# Patient Record
Sex: FEMALE | Race: BLACK OR AFRICAN AMERICAN | NOT HISPANIC OR LATINO | URBAN - METROPOLITAN AREA
[De-identification: names, ages, dates, MRNs, and addresses within clinical notes are randomized per-mention and may not be internally consistent; named-entity substitution may affect disease eponyms.]

---

## 2023-10-04 ENCOUNTER — PHARMACY VISIT (OUTPATIENT)
Dept: PHARMACY | Facility: CLINIC | Age: 73
End: 2023-10-04
Payer: MEDICAID

## 2023-10-04 PROCEDURE — RXMED WILLOW AMBULATORY MEDICATION CHARGE

## 2023-10-04 RX ORDER — GABAPENTIN 300 MG/1
CAPSULE ORAL
Qty: 60 CAPSULE | Refills: 0 | OUTPATIENT
Start: 2023-03-23 | End: 2023-12-06 | Stop reason: DRUGHIGH

## 2023-10-09 ENCOUNTER — TELEPHONE (OUTPATIENT)
Dept: PRIMARY CARE | Facility: CLINIC | Age: 73
End: 2023-10-09
Payer: COMMERCIAL

## 2023-10-09 NOTE — TELEPHONE ENCOUNTER
Pt left message stating that she is in severe pain and would like a call back to discuss adjustment of her current medications. Pt did not state where her pain is located or which medications she would like adjusted. Please advise.

## 2023-10-10 DIAGNOSIS — M81.0 AGE RELATED OSTEOPOROSIS, UNSPECIFIED PATHOLOGICAL FRACTURE PRESENCE: Primary | ICD-10-CM

## 2023-10-10 RX ORDER — ALENDRONATE SODIUM 70 MG/1
70 TABLET ORAL
COMMUNITY
End: 2023-10-10 | Stop reason: SDUPTHER

## 2023-10-11 ENCOUNTER — PHARMACY VISIT (OUTPATIENT)
Dept: PHARMACY | Facility: CLINIC | Age: 73
End: 2023-10-11
Payer: MEDICAID

## 2023-10-11 PROCEDURE — RXMED WILLOW AMBULATORY MEDICATION CHARGE

## 2023-10-11 RX ORDER — ALENDRONATE SODIUM 70 MG/1
70 TABLET ORAL
Qty: 12 TABLET | Refills: 1 | Status: SHIPPED | OUTPATIENT
Start: 2023-10-11 | End: 2024-04-06 | Stop reason: SDUPTHER

## 2023-10-15 PROBLEM — I10 HYPERTENSION: Status: ACTIVE | Noted: 2023-10-15

## 2023-10-15 PROBLEM — E21.0 PRIMARY HYPERPARATHYROIDISM (MULTI): Status: ACTIVE | Noted: 2023-10-15

## 2023-10-15 PROBLEM — D50.0 ANEMIA DUE TO BLOOD LOSS: Status: ACTIVE | Noted: 2023-10-15

## 2023-10-15 PROBLEM — R20.2 PARESTHESIA: Status: ACTIVE | Noted: 2023-10-15

## 2023-10-15 PROBLEM — E55.9 HYPOVITAMINOSIS D: Status: ACTIVE | Noted: 2023-10-15

## 2023-10-15 PROBLEM — R77.9 ELEVATED BLOOD PROTEIN: Status: ACTIVE | Noted: 2023-10-15

## 2023-10-15 PROBLEM — M81.0 OSTEOPOROSIS: Status: ACTIVE | Noted: 2023-10-15

## 2023-10-15 PROBLEM — E04.9 ENLARGED THYROID: Status: ACTIVE | Noted: 2023-10-15

## 2023-10-15 PROBLEM — M54.12 CERVICAL RADICULOPATHY: Status: ACTIVE | Noted: 2023-10-15

## 2023-10-15 RX ORDER — RISEDRONATE SODIUM 150 MG/1
TABLET, FILM COATED ORAL
COMMUNITY
Start: 2023-03-09

## 2023-10-15 RX ORDER — DICYCLOMINE HYDROCHLORIDE 10 MG/1
1 CAPSULE ORAL 3 TIMES DAILY PRN
COMMUNITY
Start: 2023-07-24 | End: 2024-06-04 | Stop reason: WASHOUT

## 2023-10-15 RX ORDER — ACETAMINOPHEN 325 MG/1
2 TABLET ORAL EVERY 6 HOURS PRN
COMMUNITY

## 2023-10-15 RX ORDER — BUSPIRONE HYDROCHLORIDE 10 MG/1
1 TABLET ORAL 2 TIMES DAILY
COMMUNITY
End: 2024-06-04 | Stop reason: WASHOUT

## 2023-10-15 RX ORDER — MAGNESIUM OXIDE/MAG AA CHELATE 300 MG
1 CAPSULE ORAL DAILY
COMMUNITY

## 2023-10-15 RX ORDER — ZOLPIDEM TARTRATE 5 MG/1
5 TABLET ORAL NIGHTLY PRN
COMMUNITY
End: 2024-06-04 | Stop reason: WASHOUT

## 2023-10-15 RX ORDER — CELECOXIB 100 MG/1
1 CAPSULE ORAL 2 TIMES DAILY
COMMUNITY
Start: 2023-01-25 | End: 2024-06-04 | Stop reason: WASHOUT

## 2023-10-15 RX ORDER — LIDOCAINE 50 MG/G
1 PATCH TOPICAL DAILY
COMMUNITY
End: 2024-06-04 | Stop reason: WASHOUT

## 2023-10-15 RX ORDER — TRAMADOL HYDROCHLORIDE 50 MG/1
1 TABLET ORAL EVERY 6 HOURS PRN
COMMUNITY
Start: 2023-04-05

## 2023-10-15 RX ORDER — ZOLPIDEM TARTRATE 10 MG/1
20 TABLET ORAL NIGHTLY PRN
COMMUNITY

## 2023-10-15 RX ORDER — ACETAMINOPHEN 500 MG
TABLET ORAL
COMMUNITY
Start: 2023-01-25

## 2023-10-15 RX ORDER — DIVALPROEX SODIUM 250 MG/1
1-2 TABLET, FILM COATED, EXTENDED RELEASE ORAL NIGHTLY PRN
COMMUNITY

## 2023-10-15 RX ORDER — IBUPROFEN 800 MG/1
800 TABLET ORAL EVERY 6 HOURS PRN
COMMUNITY

## 2023-10-15 RX ORDER — BUSPIRONE HYDROCHLORIDE 15 MG/1
1 TABLET ORAL 3 TIMES DAILY
COMMUNITY

## 2023-10-15 RX ORDER — CLONAZEPAM 0.5 MG/1
1 TABLET ORAL 2 TIMES DAILY PRN
COMMUNITY
Start: 2023-07-24
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
## 2023-10-17 ENCOUNTER — OFFICE VISIT (OUTPATIENT)
Dept: PRIMARY CARE | Facility: CLINIC | Age: 73
End: 2023-10-17
Payer: COMMERCIAL

## 2023-10-17 VITALS
WEIGHT: 123 LBS | DIASTOLIC BLOOD PRESSURE: 118 MMHG | TEMPERATURE: 97.6 F | OXYGEN SATURATION: 100 % | BODY MASS INDEX: 22.63 KG/M2 | HEART RATE: 92 BPM | HEIGHT: 62 IN | RESPIRATION RATE: 20 BRPM | SYSTOLIC BLOOD PRESSURE: 150 MMHG

## 2023-10-17 DIAGNOSIS — R63.6 PATIENT UNDERWEIGHT: ICD-10-CM

## 2023-10-17 DIAGNOSIS — F31.70 BIPOLAR AFFECTIVE DISORDER IN REMISSION (MULTI): ICD-10-CM

## 2023-10-17 DIAGNOSIS — M79.7 FIBROMYALGIA: ICD-10-CM

## 2023-10-17 DIAGNOSIS — I16.0 HYPERTENSIVE URGENCY: Primary | ICD-10-CM

## 2023-10-17 DIAGNOSIS — Z12.31 ENCOUNTER FOR SCREENING MAMMOGRAM FOR MALIGNANT NEOPLASM OF BREAST: ICD-10-CM

## 2023-10-17 DIAGNOSIS — R73.03 PREDIABETES: ICD-10-CM

## 2023-10-17 DIAGNOSIS — M81.0 AGE RELATED OSTEOPOROSIS, UNSPECIFIED PATHOLOGICAL FRACTURE PRESENCE: ICD-10-CM

## 2023-10-17 DIAGNOSIS — E55.9 VITAMIN D DEFICIENCY: ICD-10-CM

## 2023-10-17 DIAGNOSIS — I10 UNCONTROLLED HYPERTENSION: ICD-10-CM

## 2023-10-17 PROCEDURE — 1159F MED LIST DOCD IN RCRD: CPT | Performed by: FAMILY MEDICINE

## 2023-10-17 PROCEDURE — 99214 OFFICE O/P EST MOD 30 MIN: CPT | Performed by: FAMILY MEDICINE

## 2023-10-17 PROCEDURE — 3080F DIAST BP >= 90 MM HG: CPT | Performed by: FAMILY MEDICINE

## 2023-10-17 PROCEDURE — 4004F PT TOBACCO SCREEN RCVD TLK: CPT | Performed by: FAMILY MEDICINE

## 2023-10-17 PROCEDURE — 1125F AMNT PAIN NOTED PAIN PRSNT: CPT | Performed by: FAMILY MEDICINE

## 2023-10-17 PROCEDURE — 3077F SYST BP >= 140 MM HG: CPT | Performed by: FAMILY MEDICINE

## 2023-10-17 PROCEDURE — 93010 ELECTROCARDIOGRAM REPORT: CPT | Performed by: FAMILY MEDICINE

## 2023-10-17 PROCEDURE — 93005 ELECTROCARDIOGRAM TRACING: CPT | Performed by: FAMILY MEDICINE

## 2023-10-17 PROCEDURE — 99214 OFFICE O/P EST MOD 30 MIN: CPT | Mod: 25 | Performed by: FAMILY MEDICINE

## 2023-10-17 ASSESSMENT — COLUMBIA-SUICIDE SEVERITY RATING SCALE - C-SSRS
1. IN THE PAST MONTH, HAVE YOU WISHED YOU WERE DEAD OR WISHED YOU COULD GO TO SLEEP AND NOT WAKE UP?: NO
6. HAVE YOU EVER DONE ANYTHING, STARTED TO DO ANYTHING, OR PREPARED TO DO ANYTHING TO END YOUR LIFE?: NO
2. HAVE YOU ACTUALLY HAD ANY THOUGHTS OF KILLING YOURSELF?: NO

## 2023-10-17 ASSESSMENT — PAIN SCALES - GENERAL: PAINLEVEL: 10-WORST PAIN EVER

## 2023-10-17 ASSESSMENT — PATIENT HEALTH QUESTIONNAIRE - PHQ9
SUM OF ALL RESPONSES TO PHQ9 QUESTIONS 1 AND 2: 0
1. LITTLE INTEREST OR PLEASURE IN DOING THINGS: NOT AT ALL
2. FEELING DOWN, DEPRESSED OR HOPELESS: NOT AT ALL

## 2023-10-17 ASSESSMENT — ENCOUNTER SYMPTOMS
DEPRESSION: 0
OCCASIONAL FEELINGS OF UNSTEADINESS: 0
LOSS OF SENSATION IN FEET: 0

## 2023-10-17 NOTE — PATIENT INSTRUCTIONS
-please go immediately to Dr. Vallabehini's office (cardiology); you have an urgent 10:30 am appt for extremely elevated blood pressure. Please have your daughter drive you.      -please call your psychiatrist since you have not slept in over 2 days, and feeling manic.    -if on the way to Dr. Vallabehini's office you develop chest pain, visual changes, weakness, slurred speech, or facial droop please immediately call 911.    -please go to [facility] to have your labs drawn    -please schedule a follow-up visit with me in 2 weeks for blood pressure check  and 4 weeks for physical

## 2023-10-20 ENCOUNTER — TELEPHONE (OUTPATIENT)
Dept: PRIMARY CARE | Facility: CLINIC | Age: 73
End: 2023-10-20
Payer: COMMERCIAL

## 2023-10-20 NOTE — TELEPHONE ENCOUNTER
Call placed to patient. Made aware that orders are no longer needed and can just go to the lab of her choice. Should be in system.

## 2023-10-21 ENCOUNTER — LAB (OUTPATIENT)
Dept: LAB | Facility: LAB | Age: 73
End: 2023-10-21
Payer: COMMERCIAL

## 2023-10-21 DIAGNOSIS — I16.0 HYPERTENSIVE URGENCY: ICD-10-CM

## 2023-10-21 DIAGNOSIS — R73.03 PREDIABETES: ICD-10-CM

## 2023-10-21 DIAGNOSIS — G89.29 OTHER CHRONIC PAIN: Primary | ICD-10-CM

## 2023-10-21 DIAGNOSIS — R63.6 PATIENT UNDERWEIGHT: ICD-10-CM

## 2023-10-21 DIAGNOSIS — F31.70 BIPOLAR AFFECTIVE DISORDER IN REMISSION (MULTI): ICD-10-CM

## 2023-10-21 DIAGNOSIS — M79.7 FIBROMYALGIA: ICD-10-CM

## 2023-10-21 DIAGNOSIS — E55.9 VITAMIN D DEFICIENCY: ICD-10-CM

## 2023-10-21 LAB
25(OH)D3 SERPL-MCNC: 34 NG/ML (ref 31–100)
ALBUMIN SERPL-MCNC: 4.5 G/DL (ref 3.5–5)
ALP BLD-CCNC: 68 U/L (ref 35–125)
ALT SERPL-CCNC: 11 U/L (ref 5–40)
ANION GAP SERPL CALC-SCNC: 14 MMOL/L
AST SERPL-CCNC: 21 U/L (ref 5–40)
BASOPHILS # BLD AUTO: 0.03 X10*3/UL (ref 0–0.1)
BASOPHILS NFR BLD AUTO: 0.5 %
BILIRUB SERPL-MCNC: 0.2 MG/DL (ref 0.1–1.2)
BUN SERPL-MCNC: 17 MG/DL (ref 8–25)
CALCIUM SERPL-MCNC: 9.4 MG/DL (ref 8.5–10.4)
CHLORIDE SERPL-SCNC: 101 MMOL/L (ref 97–107)
CHOLEST SERPL-MCNC: 181 MG/DL (ref 133–200)
CHOLEST/HDLC SERPL: 2.7 {RATIO}
CO2 SERPL-SCNC: 24 MMOL/L (ref 24–31)
CREAT SERPL-MCNC: 0.9 MG/DL (ref 0.4–1.6)
EOSINOPHIL # BLD AUTO: 0.12 X10*3/UL (ref 0–0.4)
EOSINOPHIL NFR BLD AUTO: 2 %
ERYTHROCYTE [DISTWIDTH] IN BLOOD BY AUTOMATED COUNT: 15.9 % (ref 11.5–14.5)
EST. AVERAGE GLUCOSE BLD GHB EST-MCNC: 123 MG/DL
GFR SERPL CREATININE-BSD FRML MDRD: 68 ML/MIN/1.73M*2
GLUCOSE SERPL-MCNC: 95 MG/DL (ref 65–99)
HBA1C MFR BLD: 5.9 %
HCT VFR BLD AUTO: 44.9 % (ref 36–52)
HDLC SERPL-MCNC: 67 MG/DL
HGB BLD-MCNC: 14.8 G/DL (ref 12–17.5)
IMM GRANULOCYTES # BLD AUTO: 0.02 X10*3/UL (ref 0–0.5)
IMM GRANULOCYTES NFR BLD AUTO: 0.3 % (ref 0–0.9)
LDLC SERPL CALC-MCNC: 101 MG/DL (ref 65–130)
LYMPHOCYTES # BLD AUTO: 2.03 X10*3/UL (ref 0.8–3)
LYMPHOCYTES NFR BLD AUTO: 33.7 %
MCH RBC QN AUTO: 27.4 PG (ref 26–34)
MCHC RBC AUTO-ENTMCNC: 33 G/DL (ref 32–36)
MCV RBC AUTO: 83 FL (ref 80–100)
MONOCYTES # BLD AUTO: 0.87 X10*3/UL (ref 0.05–0.8)
MONOCYTES NFR BLD AUTO: 14.4 %
NEUTROPHILS # BLD AUTO: 2.96 X10*3/UL (ref 1.6–5.5)
NEUTROPHILS NFR BLD AUTO: 49.1 %
NRBC BLD-RTO: 0 /100 WBCS (ref 0–0)
PLATELET # BLD AUTO: 291 X10*3/UL (ref 150–450)
PMV BLD AUTO: 10.1 FL (ref 7.5–11.5)
POTASSIUM SERPL-SCNC: 4.6 MMOL/L (ref 3.4–5.1)
PREALB SERPL-MCNC: 18.1 MG/DL (ref 18–40)
PROT SERPL-MCNC: 7.4 G/DL (ref 5.9–7.9)
RBC # BLD AUTO: 5.41 X10*6/UL (ref 4–5.9)
SODIUM SERPL-SCNC: 139 MMOL/L (ref 133–145)
TRIGL SERPL-MCNC: 63 MG/DL (ref 40–150)
TSH SERPL DL<=0.05 MIU/L-ACNC: 1.44 MIU/L (ref 0.27–4.2)
VIT B12 SERPL-MCNC: 1197 PG/ML (ref 211–946)
WBC # BLD AUTO: 6 X10*3/UL (ref 4.4–11.3)

## 2023-10-21 PROCEDURE — 82607 VITAMIN B-12: CPT

## 2023-10-21 PROCEDURE — 83036 HEMOGLOBIN GLYCOSYLATED A1C: CPT

## 2023-10-21 PROCEDURE — 80061 LIPID PANEL: CPT

## 2023-10-21 PROCEDURE — 84425 ASSAY OF VITAMIN B-1: CPT

## 2023-10-21 PROCEDURE — 85025 COMPLETE CBC W/AUTO DIFF WBC: CPT

## 2023-10-21 PROCEDURE — 84443 ASSAY THYROID STIM HORMONE: CPT

## 2023-10-21 PROCEDURE — 80053 COMPREHEN METABOLIC PANEL: CPT

## 2023-10-21 PROCEDURE — 82306 VITAMIN D 25 HYDROXY: CPT

## 2023-10-21 PROCEDURE — 84134 ASSAY OF PREALBUMIN: CPT

## 2023-10-21 PROCEDURE — 36415 COLL VENOUS BLD VENIPUNCTURE: CPT

## 2023-10-23 ENCOUNTER — TELEPHONE (OUTPATIENT)
Dept: PRIMARY CARE | Facility: CLINIC | Age: 73
End: 2023-10-23
Payer: COMMERCIAL

## 2023-10-23 NOTE — TELEPHONE ENCOUNTER
I am only comfortable prescribing her gabapentin. She should discuss with her rheumatologist pain medications and pain management.  I can refer her to pain management if she desires to discuss pain medications with pain management.      She can schedule to see for a follow-up visit to discuss her pain medications.

## 2023-10-23 NOTE — TELEPHONE ENCOUNTER
Pt stopped in stating at her appt the meds to help her manage her pain were never sent. Please advise any rx to [facility]

## 2023-10-23 NOTE — TELEPHONE ENCOUNTER
She never asked for meds to be refilled. Her BP was very high and that was the fous of the visit, with urgent referral to  cardiology.  If she meds refill, please send to me as refill request. Thank you

## 2023-10-23 NOTE — TELEPHONE ENCOUNTER
The pt daughter said they came into the office with the intent of addressing her whole body soreness and swelling to the right side, but the focus of the appointment went to her BP being so high that the pain was never sddresed. Pt is on gabapentin but wanted something different to help her with her pain. This is the missed pain meds she was talking about. Do you wish to have her come back in for another appointment dedicated to her pain? Please review and advise, thank you   No

## 2023-10-24 ENCOUNTER — PHARMACY VISIT (OUTPATIENT)
Dept: PHARMACY | Facility: CLINIC | Age: 73
End: 2023-10-24
Payer: MEDICAID

## 2023-10-24 PROCEDURE — RXOTC WILLOW AMBULATORY OTC CHARGE

## 2023-10-24 PROCEDURE — RXMED WILLOW AMBULATORY MEDICATION CHARGE

## 2023-10-24 RX ORDER — LOSARTAN POTASSIUM 50 MG/1
TABLET ORAL
Qty: 45 TABLET | Refills: 1 | OUTPATIENT
Start: 2023-10-24 | End: 2024-03-25 | Stop reason: WASHOUT

## 2023-10-25 LAB — VIT B1 PYROPHOSHATE BLD-SCNC: 103 NMOL/L (ref 70–180)

## 2023-10-26 ENCOUNTER — TELEPHONE (OUTPATIENT)
Dept: PRIMARY CARE | Facility: CLINIC | Age: 73
End: 2023-10-26
Payer: COMMERCIAL

## 2023-10-26 NOTE — TELEPHONE ENCOUNTER
Spoke with pt.she states she saw the signs out front and wanted to know where we were moving to. Made aware we will remain in this building only UC is moving

## 2023-10-27 ENCOUNTER — TELEPHONE (OUTPATIENT)
Dept: PRIMARY CARE | Facility: CLINIC | Age: 73
End: 2023-10-27
Payer: COMMERCIAL

## 2023-10-27 NOTE — TELEPHONE ENCOUNTER
Spoke with pt yesterday- she does not need to schedule anything else. Called again and made pt aware via vm

## 2023-10-27 NOTE — TELEPHONE ENCOUNTER
Patient called in stating she has been getting calls that she needs to schedule an appointment with cardiology. Pt states she seen cardiology on Tuesday and wants to know if she needs to schedule another appointment or if the calls she has received are a mistake. [phone]

## 2023-11-06 ENCOUNTER — APPOINTMENT (OUTPATIENT)
Dept: PHYSICAL THERAPY | Facility: CLINIC | Age: 73
End: 2023-11-06

## 2023-11-07 ENCOUNTER — APPOINTMENT (OUTPATIENT)
Dept: PRIMARY CARE | Facility: CLINIC | Age: 73
End: 2023-11-07
Payer: COMMERCIAL

## 2023-11-08 ENCOUNTER — APPOINTMENT (OUTPATIENT)
Dept: PHYSICAL THERAPY | Facility: CLINIC | Age: 73
End: 2023-11-08

## 2023-11-20 ENCOUNTER — TELEPHONE (OUTPATIENT)
Dept: PRIMARY CARE | Facility: CLINIC | Age: 73
End: 2023-11-20
Payer: COMMERCIAL

## 2023-11-20 DIAGNOSIS — M81.0 OSTEOPOROSIS, UNSPECIFIED OSTEOPOROSIS TYPE, UNSPECIFIED PATHOLOGICAL FRACTURE PRESENCE: Primary | ICD-10-CM

## 2023-11-20 NOTE — TELEPHONE ENCOUNTER
PT came in requesting RX be sent in for handicap placard.  States she came in a few weeks ago and dropped off the paperwork, and it was mailed back without the doctors prescription.  Please call PT back when completed.  [phone]

## 2023-11-21 ENCOUNTER — APPOINTMENT (OUTPATIENT)
Dept: PEDIATRIC GASTROENTEROLOGY | Facility: CLINIC | Age: 73
End: 2023-11-21
Payer: COMMERCIAL

## 2023-11-22 ENCOUNTER — EVALUATION (OUTPATIENT)
Dept: PHYSICAL THERAPY | Facility: CLINIC | Age: 73
End: 2023-11-22
Payer: COMMERCIAL

## 2023-11-22 DIAGNOSIS — M79.601 RIGHT ARM PAIN: Primary | ICD-10-CM

## 2023-11-22 DIAGNOSIS — G89.29 OTHER CHRONIC PAIN: ICD-10-CM

## 2023-11-22 DIAGNOSIS — R52 CHRONIC PAIN OF MULTIPLE SITES: ICD-10-CM

## 2023-11-22 DIAGNOSIS — M79.641 RIGHT HAND PAIN: ICD-10-CM

## 2023-11-22 DIAGNOSIS — G89.29 CHRONIC PAIN OF MULTIPLE SITES: ICD-10-CM

## 2023-11-22 PROCEDURE — 97162 PT EVAL MOD COMPLEX 30 MIN: CPT | Mod: GP

## 2023-11-22 NOTE — PROGRESS NOTES
Physical Therapy    Physical Therapy Evaluation and Treatment      Patient Name: Christiana Ferguson  MRN: [id number]  Today's Date: 11/22/2023  Time Calculation  Start Time: 0950  Stop Time: 1025  Time Calculation (min): 35 min    Assessment:   73 yoF presenting to therapy for recurring multi site pain that was successfully treated with pain education classes at [facility] previously. Her symptoms are slowly returning and she hopes to received modalities and learn exercises to manage her symptoms. Physical therapy should include dry needling, soft tissue mobilizations, manual stretching, moist heat and light exercise.    Plan:  OP PT Plan  Treatment/Interventions: Dry needling, Education/ Instruction, Hot pack, Gait training, Electrical stimulation, Neuromuscular re-education, Manual therapy, Therapeutic exercises, Therapeutic activities  PT Plan: Skilled PT  PT Frequency: 2 times per week  Duration: 8  Rehab Potential: Good  Plan of Care Agreement: Patient    Current Problem:   1. Right arm pain        2. Other chronic pain  Referral to Physical Therapy      3. Chronic pain of multiple sites        4. Right hand pain            Subjective    Explains she has suffered from pain for several years.  Presents today for multi site pain: R neck and UE, R knee, Left foot  1) H/o Right hand surgery (carpal tunnel, trigger finger) 04/2023, [facility]. This was helpful, but her Left hand also needs surgery. Feels she is compensating by overusing her Right arm so it is making her Right arm stiff and sore. Right hand dominant  2) Right knee pain that limits her ability to walk and stand without pain  3) Left foot pain secondary to h/o fracturing multiple toes while sleep walking    Explains she did attend pain education classes at [facility] a few years ago. It was an 8 week program that included yoga, acupuncture, acupressure, and massage therapy. This was very helpful and resolved most of her pain.     Traveled to [place] in  2022 which started to slowly re-aggravate her pain. Her prior symptoms now feel exacerbated. She feels stiff, but is worried she will feel stiffer if she doesn't start exercising.     Lives alone. Adult children out of town. Daughter to return from being out of town in mid December.  Enjoys participating in [employer], making an  walking stick  Struggles with sleeping well at night. Takes multiple sleep medications, has completed numerous sleep studies, and sleep walks often.      Precautions:  HTN, neuropathy, OA, osteoporosis, RA    Pain:  5/10      Objective   Gait: Ambulates independently without device, slow but steady    Transfers: Independent with all transfers with use of UE PRN    Functional mobility: Pt demonstrates multi site AROM WFL with reports of mild pain and discomfort    Outcome Measures:  NDI  34%    Treatments:  No treatment initiated today    EDUCATION:  Education on benefits of physical therapy and plan of care  HEP not issued this date    Goals:  1) Pt will report 0/10 pain at rest and with ADL's  2) Pt will be compliant with HEP  3) Pt will be independent and painfree lifting a coffee cup  4) NDI < 20%

## 2023-12-04 ENCOUNTER — PHARMACY VISIT (OUTPATIENT)
Dept: PHARMACY | Facility: CLINIC | Age: 73
End: 2023-12-04
Payer: MEDICAID

## 2023-12-04 ENCOUNTER — OFFICE VISIT (OUTPATIENT)
Dept: OPHTHALMOLOGY | Facility: CLINIC | Age: 73
End: 2023-12-04
Payer: COMMERCIAL

## 2023-12-04 DIAGNOSIS — H52.7 UNSPECIFIED DISORDER OF REFRACTION: ICD-10-CM

## 2023-12-04 DIAGNOSIS — H25.813 COMBINED FORMS OF AGE-RELATED CATARACT OF BOTH EYES: Primary | ICD-10-CM

## 2023-12-04 PROBLEM — H25.13 AGE-RELATED NUCLEAR CATARACT OF BOTH EYES: Status: ACTIVE | Noted: 2023-12-04

## 2023-12-04 PROCEDURE — 92015 DETERMINE REFRACTIVE STATE: CPT | Performed by: OPHTHALMOLOGY

## 2023-12-04 PROCEDURE — 99213 OFFICE O/P EST LOW 20 MIN: CPT | Performed by: OPHTHALMOLOGY

## 2023-12-04 PROCEDURE — RXMED WILLOW AMBULATORY MEDICATION CHARGE

## 2023-12-04 RX ORDER — LOSARTAN POTASSIUM 100 MG/1
TABLET ORAL
Qty: 90 TABLET | Refills: 1 | OUTPATIENT
Start: 2023-12-04

## 2023-12-04 ASSESSMENT — ENCOUNTER SYMPTOMS
ALLERGIC/IMMUNOLOGIC NEGATIVE: 0
DEPRESSION: 0
OCCASIONAL FEELINGS OF UNSTEADINESS: 0
HEMATOLOGIC/LYMPHATIC NEGATIVE: 0
NEUROLOGICAL NEGATIVE: 1
EYES NEGATIVE: 0
ENDOCRINE NEGATIVE: 0
GASTROINTESTINAL NEGATIVE: 0
RESPIRATORY NEGATIVE: 0
PSYCHIATRIC NEGATIVE: 0
MUSCULOSKELETAL NEGATIVE: 0
CARDIOVASCULAR NEGATIVE: 0
CONSTITUTIONAL NEGATIVE: 0
LOSS OF SENSATION IN FEET: 0

## 2023-12-04 ASSESSMENT — KERATOMETRY
OD_K2POWER_DIOPTERS: 44.00
OD_AXISANGLE2_DEGREES: 170
OS_AXISANGLE_DEGREES: 85
OS_AXISANGLE2_DEGREES: 175
OS_K1POWER_DIOPTERS: 42.25
OD_AXISANGLE_DEGREES: 80
OS_K2POWER_DIOPTERS: 42.75
METHOD_AUTO_MANUAL: AUTOMATED
OD_K1POWER_DIOPTERS: 42.50

## 2023-12-04 ASSESSMENT — PATIENT HEALTH QUESTIONNAIRE - PHQ9
1. LITTLE INTEREST OR PLEASURE IN DOING THINGS: NOT AT ALL
2. FEELING DOWN, DEPRESSED OR HOPELESS: NOT AT ALL
SUM OF ALL RESPONSES TO PHQ9 QUESTIONS 1 AND 2: 0

## 2023-12-04 ASSESSMENT — SLIT LAMP EXAM - LIDS
COMMENTS: NORMAL
COMMENTS: NORMAL

## 2023-12-04 ASSESSMENT — REFRACTION_WEARINGRX
OD_SPHERE: +2.50
OS_SPHERE: +2.50
SPECS_TYPE: READERS

## 2023-12-04 ASSESSMENT — REFRACTION_MANIFEST
OD_SPHERE: +0.50
OD_AXIS: 015
OD_CYLINDER: -0.25

## 2023-12-04 ASSESSMENT — EXTERNAL EXAM - LEFT EYE: OS_EXAM: NORMAL

## 2023-12-04 ASSESSMENT — CUP TO DISC RATIO
OD_RATIO: 0.35
OS_RATIO: 0.35

## 2023-12-04 ASSESSMENT — EXTERNAL EXAM - RIGHT EYE: OD_EXAM: NORMAL

## 2023-12-04 ASSESSMENT — VISUAL ACUITY
OD_SC: 20/25
OS_SC: 20/25
METHOD: SNELLEN - SINGLE

## 2023-12-04 ASSESSMENT — TONOMETRY
OD_IOP_MMHG: 11
IOP_METHOD: GOLDMANN APPLANATION
OS_IOP_MMHG: 11

## 2023-12-04 ASSESSMENT — PAIN SCALES - GENERAL: PAINLEVEL: 0-NO PAIN

## 2023-12-04 NOTE — PATIENT INSTRUCTIONS
Thank you so much for choosing me to provide your care today!    If you were dilated your vision may remain blurry   or light sensitive for several hours.    The nature of eye and vision problems can require frequent follow up, please make every effort to adhere to any future appointments.    If you have any issues, questions, or concerns,   please do not hesitate to reach out.    If you receive a survey in regards to your care today, please mention any exceptional care my office staff and/or technicians provided.    You can reach our office at this number:  [phone]

## 2023-12-04 NOTE — PROGRESS NOTES
Assessment/Plan   Problem List Items Addressed This Visit          Eye/Vision problems    Combined forms of age-related cataract of both eyes - Primary     Non significant cataract noted on exam. Will plan to continue to monitor with serial exam. Suspect may be borderline with glare symptoms, will plan glare testing next year as not interested at this time for surgery.            Unspecified disorder of refraction     Discussed glasses prescription from refraction. Will provide if patient interested in keeping for records or to fill as a new set of glasses.               Provided reassurance regarding above diagnoses and care received in the office visit today. Discussed outcomes and options along with the importance of treatment compliance. Understands the importance of any follow up visits. Patient instructed to call/communicate with our office if any new issues, questions, or concerns.     Will plan to see back in 1 year for full or sooner PRN

## 2023-12-04 NOTE — ASSESSMENT & PLAN NOTE
Non significant cataract noted on exam. Will plan to continue to monitor with serial exam. Suspect may be borderline with glare symptoms, will plan glare testing next year as not interested at this time for surgery.

## 2023-12-06 ENCOUNTER — OFFICE VISIT (OUTPATIENT)
Dept: RHEUMATOLOGY | Facility: CLINIC | Age: 73
End: 2023-12-06
Payer: COMMERCIAL

## 2023-12-06 VITALS
WEIGHT: 123 LBS | BODY MASS INDEX: 23.22 KG/M2 | SYSTOLIC BLOOD PRESSURE: 120 MMHG | OXYGEN SATURATION: 97 % | HEIGHT: 61 IN | DIASTOLIC BLOOD PRESSURE: 78 MMHG | HEART RATE: 88 BPM

## 2023-12-06 DIAGNOSIS — M81.0 AGE-RELATED OSTEOPOROSIS WITHOUT CURRENT PATHOLOGICAL FRACTURE: Primary | ICD-10-CM

## 2023-12-06 DIAGNOSIS — M79.7 FIBROMYALGIA SYNDROME: ICD-10-CM

## 2023-12-06 PROCEDURE — 1125F AMNT PAIN NOTED PAIN PRSNT: CPT | Performed by: INTERNAL MEDICINE

## 2023-12-06 PROCEDURE — 1160F RVW MEDS BY RX/DR IN RCRD: CPT | Performed by: INTERNAL MEDICINE

## 2023-12-06 PROCEDURE — RXMED WILLOW AMBULATORY MEDICATION CHARGE

## 2023-12-06 PROCEDURE — 3074F SYST BP LT 130 MM HG: CPT | Performed by: INTERNAL MEDICINE

## 2023-12-06 PROCEDURE — 99214 OFFICE O/P EST MOD 30 MIN: CPT | Performed by: INTERNAL MEDICINE

## 2023-12-06 PROCEDURE — 1159F MED LIST DOCD IN RCRD: CPT | Performed by: INTERNAL MEDICINE

## 2023-12-06 PROCEDURE — 4004F PT TOBACCO SCREEN RCVD TLK: CPT | Performed by: INTERNAL MEDICINE

## 2023-12-06 PROCEDURE — 3078F DIAST BP <80 MM HG: CPT | Performed by: INTERNAL MEDICINE

## 2023-12-06 RX ORDER — GABAPENTIN 100 MG/1
100 CAPSULE ORAL 3 TIMES DAILY
Qty: 90 CAPSULE | Refills: 0 | Status: SHIPPED | OUTPATIENT
Start: 2023-12-06 | End: 2024-06-04 | Stop reason: DRUGHIGH

## 2023-12-06 ASSESSMENT — ROUTINE ASSESSMENT OF PATIENT INDEX DATA (RAPID3)
DRESS_YOURSELF: WITH SOME DIFFICULTY
IN_OUT_BED: WITH SOME DIFFICULTY
ON A SCALE OF ONE TO TEN, CONSIDERING ALL THE WAYS IN WHICH ILLNESS AND HEALTH CONDITIONS MAY AFFECT YOU AT THIS TIME, PLEASE INDICATE BELOW HOW YOU ARE DOING:: 6.5
WASH_DRY_BODY: WITHOUT ANY DIFFICULTY
PICK_CLOTHES_OFF_FLOOR: WITH SOME DIFFICULTY
TOTAL RAPID3 SCORE: 19.8
SUM OF QUESTIONS A TO J: 10
FEELINGS_ANXIETY_NERVOUS: UNABLE TO DO
PARTIPATE_RECREATIONAL_ACTIVITIES: WITH SOME DIFFICULTY
SEVERITY_SCORE: 0
WALK_KILOMETERS: UNABLE TO DO
WEIGHTED_TOTAL_SCORE: 6.6
LIFT_CUP_TO_MOUTH: WITHOUT ANY DIFFICULTY
FN_SCORE: 3.3
FEELINGS_DEPRESSION: WITHOUT ANY DIFFICULTY
ON A SCALE OF ONE TO TEN, HOW MUCH PAIN HAVE YOU HAD BECAUSE OF YOUR CONDITION OVER THE PAST WEEK?: 10
IN_OUT_TRANSPORT: WITH SOME DIFFICULTY
GOOD_NIGHTS_SLEEP: UNABLE TO DO
ON A SCALE OF ONE TO TEN, CONSIDERING ALL THE WAYS IN WHICH ILLNESS AND HEALTH CONDITIONS MAY AFFECT YOU AT THIS TIME, PLEASE INDICATE BELOW HOW YOU ARE DOING:: 6.5
ON A SCALE OF ONE TO TEN, HOW MUCH PAIN HAVE YOU HAD BECAUSE OF YOUR CONDITION OVER THE PAST WEEK?: 10
WALK_FLAT_GROUND: WITH SOME DIFFICULTY
TURN_FAUCETS_OFF: WITH SOME DIFFICULTY

## 2023-12-06 ASSESSMENT — ENCOUNTER SYMPTOMS
OCCASIONAL FEELINGS OF UNSTEADINESS: 0
LOSS OF SENSATION IN FEET: 0
DEPRESSION: 0

## 2023-12-06 ASSESSMENT — PATIENT HEALTH QUESTIONNAIRE - PHQ9
1. LITTLE INTEREST OR PLEASURE IN DOING THINGS: NOT AT ALL
SUM OF ALL RESPONSES TO PHQ9 QUESTIONS 1 AND 2: 0
2. FEELING DOWN, DEPRESSED OR HOPELESS: NOT AT ALL

## 2023-12-06 ASSESSMENT — JOINT PAIN
TOTAL NUMBER OF SWOLLEN JOINTS: 0
TOTAL NUMBER OF TENDER JOINTS: 4
TOTAL NUMBER OF TENDER JOINTS: 9

## 2023-12-06 ASSESSMENT — PAIN SCALES - GENERAL: PAINLEVEL: 9

## 2023-12-06 NOTE — PROGRESS NOTES
"Christiana Ferguson     Chief Complaint:  This 73 y.o. year old female presents with a chief complaint of osteoporosis (OP)    HPI  Subjective:  Prob. #1: OP       The patient returns for follow-up of OP treatment.       The patient reports that she takes CALCIUM CARBONATE (TUMS) 600 mg BID + VITAMIN D3 1000 units/day + ALENDRONATE 70 mg/wk.        The patient reports no adverse effects of any type to date. More specifically, she denies jaw pain, ONJ, hypocalcemic symptoms, or infections.          The patient does eat a diet inclusive of dairy products, including milk, cottage cheese and yogurts.         The patient denies distal long bone pain of the extremities, new back pain, other bony pain or incident bone fracture. There is a h/o fractures of fingers that were traumatic in nature. Three years ago she had non-pathologic fractures of toes.    Prob. #2: Body pain          The patient has had a history of variable, fibromyalgic pain. She is uncertain of the origins of the pain, but attributes at least some pain to \"overuse\".            Previously, she had taken GABAPENTIN 300 mg BID, which controlled the pain well. However, she noticed hair loss, was informed by a family member who looked it up that anti-seizure meds are sometimes associated with alopecia/hair loss, and she stopped the agent. Her hair re-grew without scarring.             Now, because of recurring and variable pain of the regions between the neck and the knees, the patient is now reconsidering starting GABAPENTIN again, because it was efficacious. She understands she could again have hair loss, but she's willing to put up with it.     Review of Systems   All other systems reviewed and are negative.    Objective:  Vital signs:  Vitals:    12/06/23 1008   BP: 120/78   Pulse: 88   SpO2: 97%   Weight: 55.8 kg (123 lb)   Height: 1.549 m (5' 1\")   PainSc:   9   PainLoc: Generalized     Physical Exam  Vitals and nursing note reviewed.   HENT:      Head: " Normocephalic.      Mouth/Throat:      Mouth: Mucous membranes are moist.   Eyes:      Extraocular Movements: Extraocular movements intact.      Conjunctiva/sclera: Conjunctivae normal.      Pupils: Pupils are equal, round, and reactive to light.   Neck:      Vascular: No carotid bruit.   Cardiovascular:      Rate and Rhythm: Normal rate and regular rhythm.      Pulses: Normal pulses.      Heart sounds: Normal heart sounds. No murmur heard.     No gallop.   Pulmonary:      Effort: Pulmonary effort is normal.      Breath sounds: Normal breath sounds. No wheezing, rhonchi or rales.   Abdominal:      General: Abdomen is flat.      Palpations: Abdomen is soft.   Musculoskeletal:      Cervical back: Normal range of motion. Rigidity present. No tenderness.   Neurological:      Mental Status: She is alert.          No active synovitis. >15 fibromyalgic tender points bilaterally above/below waist. Enthesopathic sites identified.   Assessment:  Osteoporosis - M81.0  Fibromyalgia syndrome _ M79.7  Enthesopathy of multiple sites _ M46.09  Plan:  FMS: Patient wishes to restart GABAPENTIN at a lower dose of 100 mg BID. She understands the rationale, risk/reward, and possible side effects, such as hair loss, dizziness, somnolence, and caution with driving. All queries addressed.   OP: Lab studies will be done to monitor OP therapy.  I reviewed laboratory studies from Spring and Fall 2023. I discussed those results with patient.   I spent ~7 min in pre-visit review of records and lab data, 35 min with direct patient care, and remainder for records completion.    Dionisio Curtis MD

## 2023-12-07 NOTE — PROGRESS NOTES
Subjective   Christiana Ferguson is a 73 y.o. female who presents for FOLLOW-UP VISIT Hypertension.    HPI:      74 yo female postmenopausal CURRENT SMOKER presenting as a follow-up visit.     Last seen by me on 10/17/23 as follow-up visit. At visit:  -BP uncontrolled, HTN urgency with history of intermittent, sharp non non-radiating chest pain x 3 week: BP on Manual repeat: 180/118, 179/118  ==> URGENTLY REFERRED TO CARDIOLOGY Dr. VALLABEHINI with same day appointment  -EKG performed; 84 bpm, NSR, no acute ischemic changes.   -labs ordered (pre-albumin, CBC, CMP, HgbA1c, Vitamin D, Vitamin B12, Vitamin B1); patiet advised to have labs completed at [facility]==>COMPLETED  -REFERRED TO NUTRITION  -hard copy script written for ensure complete three times a day FAXED to [facility]        After visit on 10/17/23 based on test results:  -pre-diabetes (HgBA1c 5.9)==> ADVISED to follow a low carbohydrate (limit sweets, bread, rice, potatoes, pasta), low salt, low cholesterol diet, stay active/exercise, and limit alcohol intake.  Will recheck in 3 months; NEXT DUE 1/2024     -Vitamin B12 levels elevated (1,197)==> advised to please discuss with rheumatologist who prescribes Vitamin B12 and to cut back her Vit B12 dose in half==>Will recheck in 3 months; next due ~1/2024     -grossly normal CBC. No anemia. Increased monocycte absolute (0.87). Will recheck CBC in 3 months; next due 1/2024     -prealbumin: lower limit of normal==> previously referred to nutrition and advised to continue to drink ensure shakes, and eat 3 meals a day.       -Vitamin D level is lower limit of normal (34)==> advised to take  over the counter Vitamin D3 800-1,000 units daily. Will recheck in 3 months; next due 1/2024.     -normal thyroid function     -kidney function lower limits of normal (GFR 68)==> advised to avoid NSAID medications (ibuprofen, advil, allieve, naproxen) that can worsen kidney function. Normal liver  function, glucose, and electrolytes. Will recheck in 3 months; next due 1/2024.     -Good cholesterol control. Will recheck in 3 months; next due 1/2024.          Also seen by me on 7/14/23 as a new patient visit and transfer of PCP care from Dr. Cordero to establish PCP care.  At visit:   -refilled gabapentin 300 mg bid.    -provided hard copy prescription for ensure complete shake three times a day with plan for nutrition referral at follow-up visit  -referred to case management for eviction notice and assistance with locating housing     Per  case management note 8/24/23 was able to obtain new housing at [facility].           PMHx:  -prediabetes; HgBA1c 5.9 (10/21/23) < 5.7 (3/17/23)  -bipolar disorder;   -HTN  -OA  -osteoporosis; DEXA 1/16/23  -fibromyalgia; follows with rheumatology  -BMI 22; on ensure complete  -b/l carpal tunnel syndrome and carpal tunnel release  -b/l hand pain  -heart murmur  -age-related osteoporosis  -fibromyalgia syndrome   -Vitamin D deficiency   -ruptured uterus and sepsis (2020)           Healthcare Providers:  -Psychiatrist ([facility]):  Cardiology: Dr. Vallabehini  -Rheumatology: Dr. Jeffries  - ([facility]):  Mckayla Murcia        Preventive Health Services:  -Last physical: 1+ year ago==> NOW DUE  -last mammogram: 12/12/22==> NEXT DUE 12/2023  -last colonoscopy or Cologuard: ?  -last STI screening: ? will order at follow up visit for PHYSICAL  -Hep C screening: ?; will order at follow up visit for PHYSICAL    Immunizations:   -completed Childhood vaccines  -did not complete COVID vaccine or booster  -annual Flu vaccine==> NOW DUE==> DECLINED previously and at visit today (12/8/23)  - pneumonia vaccine==> DUE NOW==> DECLINED previously and at visit today (12/8/23)  -TDAP:  DUE NOW==> DECLINED previously and at visit today (12/8/23)  - shingles vaccine:  DUE NOW==> DECLINED previously and at visit today (12/8/23)  -RSV vaccine:  DUE NOW==>  DECLINED previously and at visit today (12/8/23)  -updated COVID vaccine  DUE NOW==> DECLINED previously and at visit today (12/8/23)      There is no immunization history on file for this patient.      Advanced Care Planning:  -Code Status: FULL CODE; she states that as she gets older she may change her code status  -Advanced Directive: does not currently have one in place; provided paperwork today for patient to review, complete, and discuss with her family          INTERVAL HISTORY:     -After last visit patient and her daughter called stating that her pain was not addressed at the visit on 10/17/23. Discussed with patient and her daughter that UNCONTROLLED  HTN with associated with chest discomfort was the priority and urgently needed to be addressed at visit given that this uncontrolled HTN can lead to a heart attack and stroke and therefore became a primary focus of the visit.       -After visit  on 10/17/23 referred patient to PT, but she declined referral to pain management.     -saw cardiology Dr. Vallababehini 10/24/23 who started losartan 25 mg daily then subsequently increased dose to 100 mg daily (per patient); has scheduled follow appt 12/12/23      -has scheduled mammogram 12/11/23    -saw rheumatology  Dr. Curtis 12/7/23 who is managing osteoporosis and fibromyalgia and she restarted gabapentin since had previously stopped taking.     Today Christiana reports:       -doing and feeling well, with well controlled pain today    -reports that mood is good and feels that her anxiety and bipolar is well controlled. She states that she is sleeping very well.Today she denies depressive or manic symptoms, suicidal or homicidal ideation, intent, or plan.    -taking all medications as prescribed, including BP medications and psychiatric medications including ambien at bedtime prescribed by psychiatry with no reported adverse medication side effects.      -has cut out sweets and salts from her diet, and eating  much healthier.  She reports that she is drinking ensure shakes several times a day as well.    -has scheduled PT appt tomorrow that she will be going to    -currently helping her male friend who had surgery yesterday. Her daughter flew in from [facility] to help, and feels that she has good support is helping him post-operatively.    -likes living at [facility] that she althought it has been adjustment    -is doing workshop that she loves and is making a traditional  walking stick     -Has a  through [employer] who continues to assist and help her            Today she reports no other complaints, issues, or problems.              Review of systems is NEG for HEADACHE, NAUSEA, VOMITING, DIARRHEA, CHEST PAIN, HEART PALPITATIONS, DIZZINESS, SOB, COUGH, ORTHOPNEA, CONFUSION, WEAKNESS, LE SWELLING, AND BLEEDING. Review of systems is negative for all systems except for any identified issues in HPI above.        SHx:  -lives alone at [facility].  Has daughter locally who is supportive and assists her.  -employment: retired  -tobacco use: current smoker  -alcohol use: DENIES  -illicits: DENIES         Objective     /82   Pulse 72   Temp 36.1 °C (97 °F)   Resp 20   Wt 58 kg (127 lb 12.8 oz)   SpO2 98%   BMI 24.15 kg/m²      Physical Examination:       GENERAL           General Appearance: well-appearing, well-developed, well-hydrated, well-nourished, no acute distress.        HEENT           NECK supple, no masses or thyromegaly, no carotid bruit.        EYES           Extraocular Movements: normal, bilateral eyes GABI, no conjunctival injection.        HEART           Rate and Rhythm regular rate and rhythm. Heart sounds: normal S1S2, no S3 or S4. Murmurs: none.        CHEST           Breath sounds: Clear to IPPA, RR<16 no use of accessory muscles.        ABDOMEN           General: Neg for LKKS or masses, no scleral icterus or jaundice.        MUSCULOSKELETAL           Joints  Demonstration: Neg for erythema, swelling or joint deformities. gross abnormalities no gross abnormalities.        EXTREMITIES           Lower Extremities: Neg for cyanosis, clubbing or edema.       Assessment/Plan   Problem List Items Addressed This Visit       Hypertension - Primary    Osteoporosis    Other chronic pain     Other Visit Diagnoses       Encounter for screening mammogram for malignant neoplasm of breast        Bipolar affective disorder in remission (CMS/[facility])        Fibromyalgia        Vitamin D deficiency        Prediabetes        Patient underweight                HTN: well controlled today. At last appointment Uncontrolled HTN; HTN Urgency, with history of intermittent, sharp non non-radiating chest pain x 3 weeks.   -low salt, low carbohydrate diet  -cont losratan 100 mg daily  -cont management per cardiology Dr. Vallabehini  -911/EMS precautions discussed and reviewed with patient  -will order CMP at follow up appointment for physical     Borderline diabetes: HGBA1c 5.9 (10/21/23)  -low carbohydrate diet, regularly exercise,   -HgBA1c next due 1/2024; will order at follow-up appointment     Suboptimal Vit D levels  -cont over the counter Vitamin D3 1,000-2,000 units daily    Supratherapeutic Vitamin B12 levels  -advised to reduce dose of Vitamin b12 by half  -will order Vit B12 levels at follow-up visit    History Multiple Social stressors: patient coping well  -cont management per psychiatry     Bipolar disorder: Today Calm, no rapid speech, appropriate.  -cont psychiatric medications and management per psychiatry  -emergency department and 911/EMS psychiatric precautions discussed and reviewed     Bilateral Carpal tunnel syndrome of left and Right wrist   -continue Gabapentin 300 MG bid      Fibromyalgia and Chronic Pain  -cont management per rheumatology Dr. Jeffries  -continue Gabapentin 300 MG bid   -previously referred to physical therapy; has scheduled appt tomorrow   -ordered referral to  pain management to assist with better pain control  -patient prescribed disability placard previously    Osteoporosis; DEXA 1/2023  -cont alendronate prescribed by rheumatology, OTC calcium 1,200 mg daily divided as 400 mg three times a day with food  -weight bearing exercise as tolerated given chronic pain  -cont management per rheumatology       Breast cancer screening:  -mammogram previously ordered; has scheduled mammogram 12/11/23     Underweight due to inadequate caloric intake   -hard copy script previously written for ensure complete three times a day FAXED to [facility]  -will continue to monitor weight/BMI.  -referral to nutrition previously ordered     Smoking dependence; precontemplative  -patient declined smoking cessation counseling referral or nictotine lozenges/patches/gum for cessation  -encouraged patient to cut back     Immunization Counseling:  -flu vaccine, pneumonia, and TDAP now due==> DECLINED  -recommend updated covid vaccine and RSV vaccine that can be obtained at local pharmacy==> DECLINED            FOLLOW UP:  8 weeks for a physical/Medicare Well visit            Kaitlynn Reyes MD, PhD

## 2023-12-08 ENCOUNTER — OFFICE VISIT (OUTPATIENT)
Dept: PRIMARY CARE | Facility: CLINIC | Age: 73
End: 2023-12-08
Payer: COMMERCIAL

## 2023-12-08 VITALS
DIASTOLIC BLOOD PRESSURE: 82 MMHG | BODY MASS INDEX: 24.15 KG/M2 | RESPIRATION RATE: 20 BRPM | OXYGEN SATURATION: 98 % | TEMPERATURE: 97 F | WEIGHT: 127.8 LBS | HEART RATE: 72 BPM | SYSTOLIC BLOOD PRESSURE: 130 MMHG

## 2023-12-08 DIAGNOSIS — M79.7 FIBROMYALGIA: ICD-10-CM

## 2023-12-08 DIAGNOSIS — F31.70 BIPOLAR AFFECTIVE DISORDER IN REMISSION (MULTI): ICD-10-CM

## 2023-12-08 DIAGNOSIS — Z12.31 ENCOUNTER FOR SCREENING MAMMOGRAM FOR MALIGNANT NEOPLASM OF BREAST: ICD-10-CM

## 2023-12-08 DIAGNOSIS — I10 PRIMARY HYPERTENSION: Primary | ICD-10-CM

## 2023-12-08 DIAGNOSIS — E55.9 VITAMIN D DEFICIENCY: ICD-10-CM

## 2023-12-08 DIAGNOSIS — R73.03 PREDIABETES: ICD-10-CM

## 2023-12-08 DIAGNOSIS — G89.29 OTHER CHRONIC PAIN: ICD-10-CM

## 2023-12-08 DIAGNOSIS — M81.0 OSTEOPOROSIS, UNSPECIFIED OSTEOPOROSIS TYPE, UNSPECIFIED PATHOLOGICAL FRACTURE PRESENCE: ICD-10-CM

## 2023-12-08 DIAGNOSIS — R63.6 PATIENT UNDERWEIGHT: ICD-10-CM

## 2023-12-08 PROCEDURE — 3075F SYST BP GE 130 - 139MM HG: CPT | Performed by: FAMILY MEDICINE

## 2023-12-08 PROCEDURE — 4004F PT TOBACCO SCREEN RCVD TLK: CPT | Performed by: FAMILY MEDICINE

## 2023-12-08 PROCEDURE — 99497 ADVNCD CARE PLAN 30 MIN: CPT | Performed by: FAMILY MEDICINE

## 2023-12-08 PROCEDURE — 3079F DIAST BP 80-89 MM HG: CPT | Performed by: FAMILY MEDICINE

## 2023-12-08 PROCEDURE — 1160F RVW MEDS BY RX/DR IN RCRD: CPT | Performed by: FAMILY MEDICINE

## 2023-12-08 PROCEDURE — 1125F AMNT PAIN NOTED PAIN PRSNT: CPT | Performed by: FAMILY MEDICINE

## 2023-12-08 PROCEDURE — 1159F MED LIST DOCD IN RCRD: CPT | Performed by: FAMILY MEDICINE

## 2023-12-08 PROCEDURE — 99214 OFFICE O/P EST MOD 30 MIN: CPT | Performed by: FAMILY MEDICINE

## 2023-12-08 ASSESSMENT — PAIN SCALES - GENERAL: PAINLEVEL: 5

## 2023-12-08 NOTE — PATIENT INSTRUCTIONS
-it was nice seeing you today    -your blood pressure is doing great!    -I have referred you to pain management to discuss your ongoing chronic pain, and for them to evaluate you for additional therapies to better help control your pain if you are interested    -Please schedule to see me in 8 weeks for a PHYSICAL    -I highly recommend that you you receive from your local pharmacy the RSV vaccine and updated COVID vvaccine    -Happy Holidays and New Year

## 2023-12-14 ENCOUNTER — TREATMENT (OUTPATIENT)
Dept: PHYSICAL THERAPY | Facility: CLINIC | Age: 73
End: 2023-12-14
Payer: COMMERCIAL

## 2023-12-14 DIAGNOSIS — G89.29 OTHER CHRONIC PAIN: ICD-10-CM

## 2023-12-14 PROCEDURE — 97110 THERAPEUTIC EXERCISES: CPT | Mod: GP,CQ

## 2023-12-14 ASSESSMENT — PAIN SCALES - GENERAL: PAINLEVEL_OUTOF10: 6

## 2023-12-14 ASSESSMENT — PAIN - FUNCTIONAL ASSESSMENT: PAIN_FUNCTIONAL_ASSESSMENT: 0-10

## 2023-12-14 NOTE — PROGRESS NOTES
Physical Therapy    Physical Therapy Treatment    Patient Name: Christiana Ferguson  MRN: [id number]  Today's Date: 12/14/2023  Time Calculation  Start Time: 1428  Stop Time: 1514  Time Calculation (min): 46 min  Visit 1    Assessment:  PT Assessment  PT Assessment Results: Decreased strength, Decreased endurance, Decreased mobility, Pain  Evaluation/Treatment Tolerance: Patient tolerated treatment well  Plan:  OP PT Plan  Treatment/Interventions: Dry needling, Education/ Instruction, Hot pack, Gait training, Electrical stimulation, Neuromuscular re-education, Manual therapy, Therapeutic exercises, Therapeutic activities  PT Plan: Skilled PT  PT Frequency: 2 times per week  Duration: 8  Rehab Potential: Good  Plan of Care Agreement: Patient    Current Problem  1. Other chronic pain  Follow Up In Physical Therapy          General  PT  Visit  PT Received On: 12/14/23  Response to Previous Treatment: Patient with no complaints from previous session.  General  Reason for Referral: Chronic pain  Referred By: Dr Reyes    Subjective   Pt reports R UE, B shoulder, B hand pain.   Precautions  Precautions  Precautions Comment: low risk  Pain  Pain Assessment  Pain Assessment: 0-10  Pain Score: 6    Objective   Pt required frequent postural cues during there ex activities.    Activity Tolerance:  Activity Tolerance  Endurance:  (Tolerates 30+ minutes of exercise with occasional breaks.)  Treatments:  Therapeutic Exercise  Therapeutic Exercise Performed: Yes  Therapeutic Exercise Activity 1: UBR F/B 2.5' each  Therapeutic Exercise Activity 2: Scap retraction GTB 2x10  Therapeutic Exercise Activity 3: Rows GTB 2x10  Therapeutic Exercise Activity 4: Sh ext GTB 2x10  Therapeutic Exercise Activity 5: B ER GTB 2x10  Therapeutic Exercise Activity 6: Bicep curls 2# 2x10  Therapeutic Exercise Activity 7: Minisquats on foam 2x10  Therapeutic Exercise Activity 8: Sidestpe with GTB above knees 20'x2 L/R  Therapeutic Exercise Activity 9:  Zig-Zag with GTB above knees 20'x2 F/B each    OP EDUCATION:  Outpatient Education  Individual(s) Educated: Patient  Education Provided: Body Mechanics, Home Exercise Program  Patient/Caregiver Demonstrated Understanding: yes  Patient Response to Education: Patient/Caregiver Verbalized Understanding of Information, Patient/Caregiver Performed Return Demonstration of Exercises/Activities    Goals:  1) Pt will report 0/10 pain at rest and with ADL's  2) Pt will be compliant with HEP  3) Pt will be independent and painfree lifting a coffee cup  4) NDI < 20%

## 2023-12-27 ENCOUNTER — TREATMENT (OUTPATIENT)
Dept: PHYSICAL THERAPY | Facility: CLINIC | Age: 73
End: 2023-12-27
Payer: COMMERCIAL

## 2023-12-27 DIAGNOSIS — G89.29 OTHER CHRONIC PAIN: ICD-10-CM

## 2023-12-27 PROCEDURE — 97530 THERAPEUTIC ACTIVITIES: CPT | Mod: GP | Performed by: PHYSICAL THERAPIST

## 2023-12-27 PROCEDURE — 97110 THERAPEUTIC EXERCISES: CPT | Mod: GP | Performed by: PHYSICAL THERAPIST

## 2023-12-27 ASSESSMENT — PAIN - FUNCTIONAL ASSESSMENT: PAIN_FUNCTIONAL_ASSESSMENT: 0-10

## 2023-12-27 ASSESSMENT — PAIN SCALES - GENERAL: PAINLEVEL_OUTOF10: 6

## 2023-12-27 NOTE — PROGRESS NOTES
Physical Therapy Treatment    Patient Name: Christiana Ferguson  MRN: [id number]  Today's Date: 12/27/2023  Time Calculation  Start Time: 1405  Stop Time: 1445  Time Calculation (min): 40 min  PT Therapeutic Procedures Time Entry  Therapeutic Exercise Time Entry: 15  Therapeutic Activity Time Entry: 25  Visit # 3/16    Current Problem   1. Other chronic pain  Follow Up In Physical Therapy          Subjective   General   Reason for Referral: Chronic pain  Referred By: Dr Reyes  General Comment: Pt reports numerous pinch nerves with positive findings on ENG testing per patient. She does have R trigger finger as wel as continued R UE discomfort. She feels that there are times when she feels better and times when she is in excruciating pain. She reports that she did previous pain managtement course which had more stretching type activities with acupressure/acupuncture as well as heat and massage techniques.  Precautions:     Pain   Pain Assessment: 0-10  Pain Score: 6  Post Treatment Pain Level 6    Objective   Full R GH ROM however noted discomfort in cross body/horizontal adduction position with reduction in strength   4/5 R GH; 4+/5 L GH   R handed    Cervical flexion 40  Cervical extension 45  Cervical rotation L 58; R 70     NDI 19    Treatments:  Therapeutic Exercise  Therapeutic Exercise Performed: Yes  Therapeutic Exercise Activity 1: h/l chin tucks 10x3  Therapeutic Exercise Activity 2: supine fly no weight 10x3    Therapeutic Activity  Therapeutic Activity Performed: Yes  Therapeutic Activity 1: Re-assessment       Assessment   Assessment:   PT Assessment  Assessment Comment: Pt has only been able to attend 2 follow up visits since initial eval due to schedule and holidays. She would continue to progress with therapy and has had good response in the past, PT in agreement as pt very willing to participate in skilled interventions this date.    Plan:    Will request for therapy extension and continue with     OP  EDUCATION:   Continue with established HEP     Goals:      1) Pt will report 0/10 pain at rest and with ADL's - PROGRESSING  2) Pt will be compliant with HEP - PROGRESSING  3) Pt will be independent and painfree lifting a coffee cup - PROGRESSING  4) NDI < 20% - PROGRESSING

## 2024-01-02 ENCOUNTER — PHARMACY VISIT (OUTPATIENT)
Dept: PHARMACY | Facility: CLINIC | Age: 74
End: 2024-01-02
Payer: MEDICAID

## 2024-01-02 PROCEDURE — RXMED WILLOW AMBULATORY MEDICATION CHARGE

## 2024-01-15 ENCOUNTER — PHARMACY VISIT (OUTPATIENT)
Dept: PHARMACY | Facility: CLINIC | Age: 74
End: 2024-01-15
Payer: MEDICAID

## 2024-01-17 ENCOUNTER — TREATMENT (OUTPATIENT)
Dept: PHYSICAL THERAPY | Facility: CLINIC | Age: 74
End: 2024-01-17
Payer: COMMERCIAL

## 2024-01-17 DIAGNOSIS — G89.29 OTHER CHRONIC PAIN: ICD-10-CM

## 2024-01-17 PROCEDURE — 97110 THERAPEUTIC EXERCISES: CPT | Mod: GP

## 2024-01-17 PROCEDURE — 97140 MANUAL THERAPY 1/> REGIONS: CPT | Mod: GP

## 2024-01-17 NOTE — PROGRESS NOTES
"Physical Therapy Treatment    Patient Name: Christiana Ferguson  MRN: [id number]  Today's Date: 1/17/2024  Time Calculation  Start Time: 1115  Stop Time: 1200  Time Calculation (min): 45 min  PT Therapeutic Procedures Time Entry  Manual Therapy Time Entry: 10  Therapeutic Exercise Time Entry: 30  Visit # 4  Extended Auth Begins: 1/2/24  Extended Auth Ends: 3/31/24  Visits/Units Authorized: 56 units (approx 18 visits)    Current Problem   1. Other chronic pain  Follow Up In Physical Therapy          Subjective   Explains her pain location varies each day. Primary complaint today is Right UE pain and Left hand pain. Experiences intermittent cramping which can be uncomfortable. Has been working on her home exercise program.     Precautions:  HTN, neuropathy, OA, osteoporosis, RA      Pain   5/10 Right UE and Left hand    Post Treatment Pain Level 6    Objective   Full R GH ROM however noted discomfort in cross body/horizontal adduction position with reduction in strength   4/5 R GH; 4+/5 L GH   R handed    Cervical flexion 40  Cervical extension 45  Cervical rotation L 58; R 70     NDI 19    Treatments:  Therapeutic Exercise: 30 min  Hooklying cervical nod 3\" x 15  Hooklying cervical nod + ext isometric 3\" x 15  Hooklying cervical rotation 5\" x 10 alt  Hooklying supine punch 2 x 20, bilateral  Hooklying shoulder ext isometric with elbow flexed 5\" x 15 bilateral  Hooklying alt shoulder flexion 2 x 20    Manual Therapy: 10 min  Suboccipital release  Gentle cervical traction  Right upper trapezius active trigger point release  Right upper trapezius elongation    Moist Heat: 5 min  Right shoulder, seated          Assessment   Visit focused on patient's primary complaints of Right shoulder pain and Left hand pain. Hooklying therapeutic exercises performed to reduce upper trapezius compensation and tightness. Manual therapy and heat to supplement exercise       Plan:   Continue with current POC  Extended Auth Begins: " 1/2/24  Extended Auth Ends: 3/31/24  Visits/Units Authorized: 56 units (approx 18 visits)    OP EDUCATION:   Continue with established HEP     Goals:   1) Pt will report 0/10 pain at rest and with ADL's - PROGRESSING  2) Pt will be compliant with HEP - PROGRESSING  3) Pt will be independent and painfree lifting a coffee cup - PROGRESSING  4) NDI < 20% - PROGRESSING

## 2024-01-18 ENCOUNTER — APPOINTMENT (OUTPATIENT)
Dept: PAIN MEDICINE | Facility: CLINIC | Age: 74
End: 2024-01-18
Payer: COMMERCIAL

## 2024-01-24 ENCOUNTER — TREATMENT (OUTPATIENT)
Dept: PHYSICAL THERAPY | Facility: CLINIC | Age: 74
End: 2024-01-24
Payer: COMMERCIAL

## 2024-01-24 DIAGNOSIS — G89.29 OTHER CHRONIC PAIN: ICD-10-CM

## 2024-01-24 PROCEDURE — 97110 THERAPEUTIC EXERCISES: CPT | Mod: GP,CQ

## 2024-01-24 PROCEDURE — 97140 MANUAL THERAPY 1/> REGIONS: CPT | Mod: GP,CQ

## 2024-01-24 ASSESSMENT — PAIN SCALES - GENERAL: PAINLEVEL_OUTOF10: 4

## 2024-01-24 ASSESSMENT — PAIN DESCRIPTION - DESCRIPTORS: DESCRIPTORS: SORE

## 2024-01-24 ASSESSMENT — PAIN - FUNCTIONAL ASSESSMENT: PAIN_FUNCTIONAL_ASSESSMENT: 0-10

## 2024-01-24 NOTE — PROGRESS NOTES
Physical Therapy    Physical Therapy Treatment    Patient Name: Christiana Ferguson  MRN: [id number]  Today's Date: 1/24/2024  Visit 2/14  Ther ex 32  Manual 10  Time Calculation  Start Time: 1102  Stop Time: 1145  Time Calculation (min): 43 min      Assessment:  PT Assessment  PT Assessment Results: Decreased strength, Decreased endurance, Decreased mobility, Pain  Evaluation/Treatment Tolerance: Patient tolerated treatment well  Plan:  OP PT Plan  Treatment/Interventions: Dry needling, Education/ Instruction, Hot pack, Gait training, Electrical stimulation, Neuromuscular re-education, Manual therapy, Therapeutic exercises, Therapeutic activities  PT Plan: Skilled PT  PT Frequency: 2 times per week  Duration: 8  Rehab Potential: Good  Plan of Care Agreement: Patient    Current Problem  1. Other chronic pain  Follow Up In Physical Therapy          General  PT  Visit  PT Received On: 01/24/24  Response to Previous Treatment: Patient with no complaints from previous session.  General  Reason for Referral: Chronic pain  Referred By: Dr Reyes  General Comment: Pt reports numerous pinch nerves with positive findings on ENG testing per patient. She does have R trigger finger as wel as continued R UE discomfort. She feels that there are times when she feels better and times when she is in excruciating pain. She reports that she did previous pain managtement course which had more stretching type activities with acupressure/acupuncture as well as heat and massage techniques.    Subjective    Pt reports reduced cervical and R shoulder pain this visit.  Precautions  Precautions  Precautions Comment: low risk    Pain  Pain Assessment  Pain Assessment: 0-10  Pain Score: 4  Pain Location: Neck  Pain Orientation: Right  Pain Radiating Towards: R Shoulder blade  Pain Descriptors: Sore  Pain Frequency: Constant/continuous    Objective   Pt required fewer postural cues this visit, tight B Utraps palpated.  Activity Tolerance:  Activity  "Tolerance  Endurance: Tolerates 30+ min exercise without fatigue    Treatments:  Therapeutic Exercise  Therapeutic Exercise Performed: Yes  Therapeutic Exercise Activity 1: UBR F/B 2.5' each  Therapeutic Exercise Activity 2: Scap retraction GTB 2x10  Therapeutic Exercise Activity 3: Rows BTB 2x10  Therapeutic Exercise Activity 4: Sh ext BTB 2x10  Therapeutic Exercise Activity 5: B ER BTB 2x10  Therapeutic Exercise Activity 6: Bicep curls 2# 2x10  Therapeutic Exercise Activity 7: Utrap stretch 20\"x3 L/R each  Therapeutic Exercise Activity 8: Chin tucks 5\" 1x10  Therapeutic Exercise Activity 9: Retro shoulder rolls x20    Manual Therapy  Manual Therapy Performed: Yes  Manual Therapy Activity 1: DSTM, MFR B utraps and levators,  Manual Therapy Activity 2: STM cervical PSPs.    OP EDUCATION:  Outpatient Education  Individual(s) Educated: Patient  Patient/Caregiver Demonstrated Understanding: yes  Patient Response to Education: Patient/Caregiver Verbalized Understanding of Information, Patient/Caregiver Performed Return Demonstration of Exercises/Activities, Patient/Caregiver Asked Appropriate Questions    Goals:  1) Pt will report 0/10 pain at rest and with ADL's - PROGRESSING  2) Pt will be compliant with HEP - PROGRESSING  3) Pt will be independent and painfree lifting a coffee cup - PROGRESSING  4) NDI < 20% - PROGRESSING  "

## 2024-01-31 ENCOUNTER — DOCUMENTATION (OUTPATIENT)
Dept: PHYSICAL THERAPY | Facility: CLINIC | Age: 74
End: 2024-01-31
Payer: COMMERCIAL

## 2024-01-31 NOTE — PROGRESS NOTES
Physical Therapy                 Therapy Communication Note    Patient Name: Christiana Ferguson  MRN: [id number]  Today's Date: 1/31/2024     Discipline: Physical Therapy    Missed Visit Reason:      Missed Time: No Show    Comment:

## 2024-02-07 ENCOUNTER — TREATMENT (OUTPATIENT)
Dept: PHYSICAL THERAPY | Facility: CLINIC | Age: 74
End: 2024-02-07
Payer: COMMERCIAL

## 2024-02-07 DIAGNOSIS — G89.29 OTHER CHRONIC PAIN: ICD-10-CM

## 2024-02-07 PROCEDURE — 97110 THERAPEUTIC EXERCISES: CPT | Mod: GP

## 2024-02-07 NOTE — PROGRESS NOTES
"Physical Therapy    Physical Therapy Treatment    Patient Name: Christiana Ferguson  MRN: [id number]  Today's Date: 2/7/2024  Visit 3/14    Time Calculation  Start Time: 1115  Stop Time: 1200  Time Calculation (min): 45 min      Assessment: Today's visit focused on Right shoulder mobility and strengthening. Pt challenged with all activities and needed frequent rest breaks. No increase in pain, just fatigue.      Plan:  Continue with current POC  Extended Auth Begins: 1/2/24  Extended Auth Ends: 3/31/24  Visits/Units Authorized: 56 units (approx 18 visits)    Current Problem  1. Other chronic pain  Follow Up In Physical Therapy            Subjective    Expresses multi-site pain today that she has been managing with a pain patch. Her sleep has also been interrupted so that's not helping. Would like to focus on her Right shoulder today. She is now able to lift a cup but it still hurts.     Precautions  HTN, neuropathy, OA, osteoporosis, RA      Pain  5/10 multi site pain    Objective   Right shoulder flexion: 150, pain and clicking mid range     Treatments:  Right shoulder flexion wall slide 3 x 10  Wall push up 3 x 10  Standing scapular retraction x 20  Seated arm bike x 5 min fwd, 5 min bkwd  Seated ER with retraction x 30 B  Supinated bicep curls, 1# x 30 R   Supination/Pronation, 1#  R 3 x 10  Seated pendulums, 1# x 2 min  Seated inferior glides, 10\" x 10   Seated cross body stretch 10 \"x 10  Seated cervical rotation, alt x 20       OP EDUCATION:    Goals:  1) Pt will report 0/10 pain at rest and with ADL's - PROGRESSING  2) Pt will be compliant with HEP - PROGRESSING  3) Pt will be independent and painfree lifting a coffee cup - ACHIEVED   4) NDI < 20% - PROGRESSING  "

## 2024-02-21 ENCOUNTER — TREATMENT (OUTPATIENT)
Dept: PHYSICAL THERAPY | Facility: CLINIC | Age: 74
End: 2024-02-21
Payer: COMMERCIAL

## 2024-02-21 DIAGNOSIS — G89.29 OTHER CHRONIC PAIN: ICD-10-CM

## 2024-02-21 PROCEDURE — 97110 THERAPEUTIC EXERCISES: CPT | Mod: GP

## 2024-02-21 NOTE — PROGRESS NOTES
"Physical Therapy    Physical Therapy Treatment    Patient Name: Christiana Ferguson  MRN: [id number]  Today's Date: 2/21/2024  Visit 4/14  Time In: 1020  Time Out: 1100  Total time: 40 min therapeutic exercise    Assessment: Improved tolerance to all activities today and no rest breaks needed. No increase in pain, just fatigue.      Plan:  Continue with current POC  Extended Auth Begins: 1/2/24  Extended Auth Ends: 3/31/24  Visits/Units Authorized: 56 units (approx 18 visits)    Current Problem  1. Other chronic pain  Follow Up In Physical Therapy            Subjective    She reports she feels frequent, total body cramping. She is trying different exercises to see what helps and what doesn't. Started doing an adult ballet class.     Precautions  HTN, neuropathy, OA, osteoporosis, RA      Pain  5/10 multi site pain    Objective   Right shoulder flexion: 150, pain and clicking mid range     Treatments:  Seated recumbent bike UE and LE x 5 min fwd, 5 min bkwd  Right shoulder flexion wall slide 3 x 10  Wall push up 2 x 15  Standing scapular retraction x 30  Seated ER with retraction x 30 B  Supinated bicep curls, 2# x 30 R   Supination/Pronation, 2#  R 3 x 10  Seated pendulums, 2# x 2 min  Seated inferior glides, 10\" x 10 (not today)  Seated cross body stretch 10 \"x 10  Seated cervical rotation, alt x 20       OP EDUCATION:    Goals:  1) Pt will report 0/10 pain at rest and with ADL's - PROGRESSING  2) Pt will be compliant with HEP - PROGRESSING  3) Pt will be independent and painfree lifting a coffee cup - ACHIEVED   4) NDI < 20% - PROGRESSING  "

## 2024-02-22 ENCOUNTER — LAB (OUTPATIENT)
Dept: LAB | Facility: LAB | Age: 74
End: 2024-02-22
Payer: COMMERCIAL

## 2024-02-22 DIAGNOSIS — M81.0 AGE-RELATED OSTEOPOROSIS WITHOUT CURRENT PATHOLOGICAL FRACTURE: ICD-10-CM

## 2024-02-22 LAB
MAGNESIUM SERPL-MCNC: 2.2 MG/DL (ref 1.6–3.1)
PHOSPHATE SERPL-MCNC: 4.3 MG/DL (ref 2.5–4.5)

## 2024-02-22 PROCEDURE — 84100 ASSAY OF PHOSPHORUS: CPT

## 2024-02-22 PROCEDURE — 83970 ASSAY OF PARATHORMONE: CPT

## 2024-02-22 PROCEDURE — 36415 COLL VENOUS BLD VENIPUNCTURE: CPT

## 2024-02-22 PROCEDURE — 83735 ASSAY OF MAGNESIUM: CPT

## 2024-02-23 LAB — PTH-INTACT SERPL-MCNC: 50.7 PG/ML (ref 18.5–88)

## 2024-02-27 ENCOUNTER — OFFICE VISIT (OUTPATIENT)
Dept: RHEUMATOLOGY | Facility: CLINIC | Age: 74
End: 2024-02-27
Payer: COMMERCIAL

## 2024-02-27 VITALS
OXYGEN SATURATION: 96 % | BODY MASS INDEX: 23.98 KG/M2 | HEART RATE: 85 BPM | WEIGHT: 127 LBS | HEIGHT: 61 IN | DIASTOLIC BLOOD PRESSURE: 68 MMHG | SYSTOLIC BLOOD PRESSURE: 120 MMHG

## 2024-02-27 DIAGNOSIS — M19.042 PRIMARY OSTEOARTHRITIS OF LEFT HAND: ICD-10-CM

## 2024-02-27 DIAGNOSIS — M17.11 PRIMARY OSTEOARTHRITIS OF RIGHT KNEE: Primary | ICD-10-CM

## 2024-02-27 PROCEDURE — 3074F SYST BP LT 130 MM HG: CPT | Performed by: INTERNAL MEDICINE

## 2024-02-27 PROCEDURE — 1159F MED LIST DOCD IN RCRD: CPT | Performed by: INTERNAL MEDICINE

## 2024-02-27 PROCEDURE — 1125F AMNT PAIN NOTED PAIN PRSNT: CPT | Performed by: INTERNAL MEDICINE

## 2024-02-27 PROCEDURE — 99213 OFFICE O/P EST LOW 20 MIN: CPT | Performed by: INTERNAL MEDICINE

## 2024-02-27 PROCEDURE — 3078F DIAST BP <80 MM HG: CPT | Performed by: INTERNAL MEDICINE

## 2024-02-27 RX ORDER — DICLOFENAC SODIUM 10 MG/G
2 GEL TOPICAL 2 TIMES DAILY PRN
Qty: 100 G | Refills: 0 | Status: SHIPPED | OUTPATIENT
Start: 2024-02-27 | End: 2024-03-26

## 2024-02-27 ASSESSMENT — ROUTINE ASSESSMENT OF PATIENT INDEX DATA (RAPID3)
WEIGHTED_TOTAL_SCORE: 5.6
LIFT_CUP_TO_MOUTH: WITHOUT ANY DIFFICULTY
SUM OF QUESTIONS A TO J: 7
GOOD_NIGHTS_SLEEP: WITH MUCH DIFFICULTY
PICK_CLOTHES_OFF_FLOOR: WITHOUT ANY DIFFICULTY
TOTAL RAPID3 SCORE: 16.8
ON A SCALE OF ONE TO TEN, HOW MUCH PAIN HAVE YOU HAD BECAUSE OF YOUR CONDITION OVER THE PAST WEEK?: 7.5
FEELINGS_ANXIETY_NERVOUS: WITH SOME DIFFICULTY
FEELINGS_DEPRESSION: WITHOUT ANY DIFFICULTY
DRESS_YOURSELF: WITH SOME DIFFICULTY
IN_OUT_TRANSPORT: WITH SOME DIFFICULTY
PARTIPATE_RECREATIONAL_ACTIVITIES: WITH SOME DIFFICULTY
ON A SCALE OF ONE TO TEN, CONSIDERING ALL THE WAYS IN WHICH ILLNESS AND HEALTH CONDITIONS MAY AFFECT YOU AT THIS TIME, PLEASE INDICATE BELOW HOW YOU ARE DOING:: 7
WASH_DRY_BODY: WITH SOME DIFFICULTY
SEVERITY_SCORE: 0
TURN_FAUCETS_OFF: WITHOUT ANY DIFFICULTY
IN_OUT_BED: WITH SOME DIFFICULTY
ON A SCALE OF ONE TO TEN, CONSIDERING ALL THE WAYS IN WHICH ILLNESS AND HEALTH CONDITIONS MAY AFFECT YOU AT THIS TIME, PLEASE INDICATE BELOW HOW YOU ARE DOING:: 7
WALK_FLAT_GROUND: WITHOUT ANY DIFFICULTY
FN_SCORE: 2.3
WALK_KILOMETERS: WITH MUCH DIFFICULTY
ON A SCALE OF ONE TO TEN, HOW MUCH PAIN HAVE YOU HAD BECAUSE OF YOUR CONDITION OVER THE PAST WEEK?: 7.5

## 2024-02-27 ASSESSMENT — JOINT PAIN
TOTAL NUMBER OF TENDER JOINTS: 3
TOTAL NUMBER OF SWOLLEN JOINTS: 0

## 2024-02-27 ASSESSMENT — ENCOUNTER SYMPTOMS
LOSS OF SENSATION IN FEET: 0
DEPRESSION: 0
OCCASIONAL FEELINGS OF UNSTEADINESS: 0

## 2024-02-27 ASSESSMENT — PAIN SCALES - GENERAL: PAINLEVEL: 7

## 2024-02-27 NOTE — PROGRESS NOTES
"Chief Complaint:  This 73 y.o. female presents with the chief complaints of osteoporosis (OP), mild left hand pain, and fibromyalgia syndrome (FMS).     Subjective:   HPI   Problem:  1: OP       The patient reports faring well on her current treatment with ALENDRONATE, CALCIUM & VITAMIN D3.        The patient reports feeling good and experiencing no adverse drug effects whatsoever. In particular, the patient denies any adverse effects from ALENDRONATE 70 mg weekly. More specifically, she denies any GI adverse events, jaw pain, oral pain due to ONJ, hypocalcemic symptoms, or deep bony pain (e.g., hip or femur).    2. Left hand pain         The patient reports having variable left hand pain. More specifically, she points to the palmar region over the 4th and 5th MCPs. However, she has no hand joint stiffness or swelling or loss of joint mobility. She points to the 4th PIP and mentions some \"stiffness\"; the triggering has resolved. She exercises and massages the palmar region due to the variable MCP-joint related pain.  However, the patient again emphasizes the absence of joint swelling. Notwithstanding, her  strength of the left hand is definitely less than the right hand.    3.  FMS          The patient reports a \"very good\" result from restarting GABAPENTIN 100 mg daily or twice daily.           In the morning, however, the patient does experience stiffness and variable aching of the extremities which \"eases out\" within 30-60 min. The remainder of the day she can function \"OK until I wear myself out and I know the minor aches and pains are to be expected\". The \"excruciating\" pain she formerly experienced is resolved.    Review of Systems   All other systems reviewed and are negative.    Objective:   /68 (BP Location: Right arm, Patient Position: Sitting)   Pulse 85   Ht 1.549 m (5' 1\")   Wt 57.6 kg (127 lb)   SpO2 96%   BMI 24.00 kg/m²      Physical Exam      No active synovitis; left 3rd and 4th MCP " tenderness w/palpation. No tendon nodules or triggering identified. Right knee: no synovitis, mild medial joint line tenderness likely anserine bursitis and pain with patellar downward force with concomitant hyperextension, suggesting patellofemoral arthritis.  Assessment:   Primary osteoarthritis of right knee- M17.11  Primary osteoarthritis of left hand - M19.042  Fibromyalgia syndrome - M79.7  Osteoporosis (OP) - M81.0    Plan:    Injection of left 3rd and 4th MCPs may be indicated. First, use trial of VOLTARIN GEL BID x 3-5 days; if not improved, patient may consider injection of those joints. OT of hands is unnecessary.  Xray of right knee for OA  Return to office 3 months or earlier for injections if desired         Dionisio Curtis MD

## 2024-02-29 ENCOUNTER — APPOINTMENT (OUTPATIENT)
Dept: PHYSICAL THERAPY | Facility: CLINIC | Age: 74
End: 2024-02-29
Payer: COMMERCIAL

## 2024-02-29 ENCOUNTER — DOCUMENTATION (OUTPATIENT)
Dept: PHYSICAL THERAPY | Facility: CLINIC | Age: 74
End: 2024-02-29
Payer: COMMERCIAL

## 2024-02-29 ENCOUNTER — HOSPITAL ENCOUNTER (OUTPATIENT)
Dept: RADIOLOGY | Facility: HOSPITAL | Age: 74
Discharge: HOME | End: 2024-02-29
Payer: COMMERCIAL

## 2024-02-29 DIAGNOSIS — M17.11 PRIMARY OSTEOARTHRITIS OF RIGHT KNEE: ICD-10-CM

## 2024-02-29 PROCEDURE — 73562 X-RAY EXAM OF KNEE 3: CPT | Mod: RT

## 2024-02-29 NOTE — PROGRESS NOTES
Physical Therapy                 Therapy Communication Note    Patient Name: Christiana Ferguson  MRN: [id number]  Today's Date: 2/29/2024     Discipline: Physical Therapy    Missed Visit Reason:      Missed Time: Cancel    Comment: Rescheduled

## 2024-03-01 ENCOUNTER — PHARMACY VISIT (OUTPATIENT)
Dept: PHARMACY | Facility: CLINIC | Age: 74
End: 2024-03-01
Payer: MEDICAID

## 2024-03-01 PROCEDURE — RXMED WILLOW AMBULATORY MEDICATION CHARGE

## 2024-03-05 ENCOUNTER — APPOINTMENT (OUTPATIENT)
Dept: PRIMARY CARE | Facility: CLINIC | Age: 74
End: 2024-03-05
Payer: COMMERCIAL

## 2024-03-07 ENCOUNTER — APPOINTMENT (OUTPATIENT)
Dept: PHYSICAL THERAPY | Facility: CLINIC | Age: 74
End: 2024-03-07
Payer: COMMERCIAL

## 2024-03-11 ENCOUNTER — APPOINTMENT (OUTPATIENT)
Dept: PHYSICAL THERAPY | Facility: CLINIC | Age: 74
End: 2024-03-11
Payer: COMMERCIAL

## 2024-03-12 ENCOUNTER — TELEPHONE (OUTPATIENT)
Dept: PRIMARY CARE | Facility: CLINIC | Age: 74
End: 2024-03-12
Payer: COMMERCIAL

## 2024-03-12 NOTE — TELEPHONE ENCOUNTER
Please call patient.  She is on a lot of other medications prescribed by her psychiatrist. Please advise her to call her psychiatrist and alert them of her dizziness.  Please advise her if her dizziness is worsening to go to the emergency room. Please also advise her that if she develops chest pain, heart palpitations, confusion, weakness, or passes out to immediately call 911.

## 2024-03-14 ENCOUNTER — DOCUMENTATION (OUTPATIENT)
Dept: PHYSICAL THERAPY | Facility: CLINIC | Age: 74
End: 2024-03-14
Payer: COMMERCIAL

## 2024-03-14 ENCOUNTER — APPOINTMENT (OUTPATIENT)
Dept: PHYSICAL THERAPY | Facility: CLINIC | Age: 74
End: 2024-03-14
Payer: COMMERCIAL

## 2024-03-14 NOTE — PROGRESS NOTES
sPhysical Therapy                 Therapy Communication Note    Patient Name: Christiana Ferguson  MRN: [id number]  Today's Date: 3/14/2024     Discipline: Physical Therapy    Missed Visit Reason:      Missed Time: Cancel    Comment:

## 2024-03-18 ENCOUNTER — TREATMENT (OUTPATIENT)
Dept: PHYSICAL THERAPY | Facility: CLINIC | Age: 74
End: 2024-03-18
Payer: COMMERCIAL

## 2024-03-18 DIAGNOSIS — G89.29 OTHER CHRONIC PAIN: ICD-10-CM

## 2024-03-18 PROCEDURE — 97110 THERAPEUTIC EXERCISES: CPT | Mod: GP

## 2024-03-18 NOTE — PROGRESS NOTES
Physical Therapy    Physical Therapy Treatment    Patient Name: Christiana Ferguson  MRN: [id number]  Today's Date: 03/18/2024  Visit 5/14  Time In: 1100  Time Out: 1130  Total time: 30 min therapeutic exercise    Assessment: Last scheduled visit on her current POC (5/14 visits). Pt would benefit greatly from chronic pain treatment, and will follow up with our chronic pain specialist.      Plan:  Continue with current POC  Extended Auth Begins: 1/2/24  Extended Auth Ends: 3/31/24  Visits/Units Authorized: 56 units (approx 18 visits)    Current Problem  1. Other chronic pain  Follow Up In Physical Therapy            Subjective    Pt reports that she had a recent exacerbation of neck pain as a result of taking the greyhound from [place] to [place]     Precautions  HTN, neuropathy, OA, osteoporosis, RA      Pain  5/10 multi site pain    Objective   Cervical AROM    Flexion:45   Extension: 40  Rotation   L: 55 R; 57    Palpation; signficant tenderness noted BL UT, into cervical paraspinals     Strength  RUE: 4/5  LUE: 4/5    Treatments:  Ube 3/3  Reassessment  Review HEP       Goals:  1) Pt will report 0/10 pain at rest and with ADL's - PROGRESSING  2) Pt will be compliant with HEP - PROGRESSING  3) Pt will be independent and painfree lifting a coffee cup - ACHIEVED   4) NDI < 20% - PROGRESSING

## 2024-03-19 ENCOUNTER — TELEPHONE (OUTPATIENT)
Dept: RHEUMATOLOGY | Facility: CLINIC | Age: 74
End: 2024-03-19
Payer: COMMERCIAL

## 2024-03-19 NOTE — TELEPHONE ENCOUNTER
Patient was on long extended Bus Trip which has aggravated her neck pain issues causing migraines and she can't sleep.  She is taking Gabapentin and Tylenol Arthritis.  Uses [facility].  Would like additional medication.

## 2024-03-24 NOTE — PATIENT INSTRUCTIONS
It was nice seeing you today.    Please continue losartan 100 mg daily in the morning and start amlodipine 5 mg daily in the morning.  You stopped taking hydrochlorothiazide 25 mg daily--please do not take.     Please go to [facility] lab or another [facility] lab facility to complete the lab testing that I ordered . You can walk in to radiology when you get your labs drawn to have your neck xray performed that I ordered     Please call radiology to schedule: 1) mammogram; 2) CT head and neck angiogram for history of dizziness ; 3) carotid ultrasound for history of dizziness, and 3) CT Heart calcium scoring to rule out coronary heart disease that can cause heart attacks       Please call referral line to schedule appointments for evaluation of dizziness  with: 1) neurology 2) ENT; and 3) ophthalmology for an eye exam.    Please call referral line to schedule appointments with: 1) physical therapy and 2) physical medicine and rehabilitation for neck pain and fibromyalgia pain.    I have ordered a referral for a colonoscopy or a cologuard for colon cancer screening.  The cologuard testing kit will be sent to your house, and follow directions and send back to the company. Cologuard testing may be more convenient for you to complete.     Please call to schedule to see cardiology either  at [facility] Dr. Boland  who you saw in February or Dr. Vallabehini who you previously saw to rule out that the dizziness is being caused by your heart or heart beat.     I have also placed a referral to pharmacy to review all of your medications and identify any that may cause dizziness     Please call your psychiatrist to let them know about your dizziness, given that they prescribe multiple medications that could potentially cause dizziness.    I have ordered a cologuard colon cancer screening test that will be sent to your house. Follow the directions and send back to the company for processing.                 I  recommend receiving the TDAP booster that prevents tetanus and other infectious disease, shingles vaccine, pneumonia vaccine and  the flu vaccine     I recommend the updated COVID vaccine and RSV vaccine that can be obtained at your local pharmacy     Please schedule a follow up with me in 4 -6 weeks to discuss and review your test results     Please try to cut back and quit smoking     If you develop chest pain, heart palpitations, worsening dizziness, confusion, difficulty talking or walking, pass out, or fall please immediately call 911.

## 2024-03-25 ENCOUNTER — OFFICE VISIT (OUTPATIENT)
Dept: PRIMARY CARE | Facility: CLINIC | Age: 74
End: 2024-03-25
Payer: COMMERCIAL

## 2024-03-25 VITALS
TEMPERATURE: 97.1 F | BODY MASS INDEX: 24.49 KG/M2 | RESPIRATION RATE: 20 BRPM | OXYGEN SATURATION: 99 % | SYSTOLIC BLOOD PRESSURE: 150 MMHG | HEART RATE: 95 BPM | WEIGHT: 129.6 LBS | DIASTOLIC BLOOD PRESSURE: 92 MMHG

## 2024-03-25 DIAGNOSIS — Z71.85 IMMUNIZATION COUNSELING: ICD-10-CM

## 2024-03-25 DIAGNOSIS — M79.7 FIBROMYALGIA: ICD-10-CM

## 2024-03-25 DIAGNOSIS — M54.2 NECK PAIN: Primary | ICD-10-CM

## 2024-03-25 DIAGNOSIS — G89.29 OTHER CHRONIC PAIN: ICD-10-CM

## 2024-03-25 DIAGNOSIS — I10 PRIMARY HYPERTENSION: ICD-10-CM

## 2024-03-25 DIAGNOSIS — R42 DIZZINESS: ICD-10-CM

## 2024-03-25 DIAGNOSIS — F31.70 BIPOLAR AFFECTIVE DISORDER IN REMISSION (MULTI): ICD-10-CM

## 2024-03-25 DIAGNOSIS — Z12.31 ENCOUNTER FOR SCREENING MAMMOGRAM FOR MALIGNANT NEOPLASM OF BREAST: ICD-10-CM

## 2024-03-25 DIAGNOSIS — R74.8 ELEVATED VITAMIN B12 LEVEL: ICD-10-CM

## 2024-03-25 DIAGNOSIS — Z13.6 ENCOUNTER FOR SCREENING FOR CORONARY ARTERY DISEASE: ICD-10-CM

## 2024-03-25 DIAGNOSIS — R73.03 BORDERLINE DIABETES: ICD-10-CM

## 2024-03-25 DIAGNOSIS — Z12.11 COLON CANCER SCREENING: ICD-10-CM

## 2024-03-25 DIAGNOSIS — M81.0 OSTEOPOROSIS, UNSPECIFIED OSTEOPOROSIS TYPE, UNSPECIFIED PATHOLOGICAL FRACTURE PRESENCE: ICD-10-CM

## 2024-03-25 DIAGNOSIS — E55.9 VITAMIN D DEFICIENCY: ICD-10-CM

## 2024-03-25 LAB — POC HEMOGLOBIN A1C: 6.1 % (ref 4.2–6.5)

## 2024-03-25 PROCEDURE — 83036 HEMOGLOBIN GLYCOSYLATED A1C: CPT | Mod: QW | Performed by: FAMILY MEDICINE

## 2024-03-25 PROCEDURE — 3080F DIAST BP >= 90 MM HG: CPT | Performed by: FAMILY MEDICINE

## 2024-03-25 PROCEDURE — 3077F SYST BP >= 140 MM HG: CPT | Performed by: FAMILY MEDICINE

## 2024-03-25 PROCEDURE — 99214 OFFICE O/P EST MOD 30 MIN: CPT | Performed by: FAMILY MEDICINE

## 2024-03-25 PROCEDURE — RXMED WILLOW AMBULATORY MEDICATION CHARGE

## 2024-03-25 PROCEDURE — 1159F MED LIST DOCD IN RCRD: CPT | Performed by: FAMILY MEDICINE

## 2024-03-25 PROCEDURE — 1125F AMNT PAIN NOTED PAIN PRSNT: CPT | Performed by: FAMILY MEDICINE

## 2024-03-25 PROCEDURE — 4004F PT TOBACCO SCREEN RCVD TLK: CPT | Performed by: FAMILY MEDICINE

## 2024-03-25 RX ORDER — AMLODIPINE BESYLATE 5 MG/1
5 TABLET ORAL DAILY
Qty: 90 TABLET | Refills: 3 | Status: SHIPPED | OUTPATIENT
Start: 2024-03-25 | End: 2025-03-25

## 2024-03-25 ASSESSMENT — ENCOUNTER SYMPTOMS
OCCASIONAL FEELINGS OF UNSTEADINESS: 0
LOSS OF SENSATION IN FEET: 0
DEPRESSION: 0

## 2024-03-25 ASSESSMENT — PAIN SCALES - GENERAL: PAINLEVEL: 2

## 2024-03-25 ASSESSMENT — COLUMBIA-SUICIDE SEVERITY RATING SCALE - C-SSRS
6. HAVE YOU EVER DONE ANYTHING, STARTED TO DO ANYTHING, OR PREPARED TO DO ANYTHING TO END YOUR LIFE?: NO
2. HAVE YOU ACTUALLY HAD ANY THOUGHTS OF KILLING YOURSELF?: NO
1. IN THE PAST MONTH, HAVE YOU WISHED YOU WERE DEAD OR WISHED YOU COULD GO TO SLEEP AND NOT WAKE UP?: NO

## 2024-03-26 ENCOUNTER — PHARMACY VISIT (OUTPATIENT)
Dept: PHARMACY | Facility: CLINIC | Age: 74
End: 2024-03-26
Payer: MEDICAID

## 2024-03-26 PROCEDURE — RXOTC WILLOW AMBULATORY OTC CHARGE

## 2024-03-28 ENCOUNTER — TREATMENT (OUTPATIENT)
Dept: PHYSICAL THERAPY | Facility: CLINIC | Age: 74
End: 2024-03-28
Payer: COMMERCIAL

## 2024-03-28 DIAGNOSIS — G89.29 OTHER CHRONIC PAIN: ICD-10-CM

## 2024-03-28 PROCEDURE — 97110 THERAPEUTIC EXERCISES: CPT | Mod: GP,CQ

## 2024-03-28 ASSESSMENT — PAIN - FUNCTIONAL ASSESSMENT: PAIN_FUNCTIONAL_ASSESSMENT: 0-10

## 2024-03-28 ASSESSMENT — PAIN SCALES - GENERAL: PAINLEVEL_OUTOF10: 0 - NO PAIN

## 2024-03-28 NOTE — PROGRESS NOTES
"Physical Therapy Treatment    Patient Name: Christiana Ferguson  MRN: [id number]  Today's Date: 3/28/2024  Time Calculation  Start Time: 0930  Stop Time: 1008  Time Calculation (min): 38 min  PT Therapeutic Procedures Time Entry  Therapeutic Exercise Time Entry: 38    Insurance:  Visit number: 6 of 14  Authorization info: Extended Auth Begins: 1/2/24  Extended Auth Ends: 3/31/24  Visits/Units Authorized: 56 units (approx 18 visits)    Insurance Type: Payor: [facility] / Plan: [facility] AGED BLIND AND DISABLED / Product Type: *No Product type* /     Current Problem   1. Other chronic pain  Follow Up In Physical Therapy          Subjective   General    When asked about pain, patient states \"of course not, I'm heavily medicated\". Patient not a good historian today, repeating that she was on a [employer] bus but not directly answering pain/function related questions.     Precautions:   Chronic pain    Pain   Pain Assessment: 0-10  Pain Score: 0 - No pain    Post Treatment Pain Level   N/a    Objective   Limited cervical side bend    Posture: FFP with rounded shoulder and forward head    Treatments:  Therapeutic Exercise:  Therapeutic Exercise  Therapeutic Exercise Performed: Yes  Therapeutic Exercise Activity 1: UBR F/B 2.5' each  Therapeutic Exercise Activity 2: Scap retraction GTB 2x10  Therapeutic Exercise Activity 3: seated UT stretch with shld distraction 3 x 20\" H  Therapeutic Exercise Activity 4: seated LS stretch 3 x 20\"  Therapeutic Exercise Activity 5: seated cervical retraction 10x2  Therapeutic Exercise Activity 6: Retro shoulder rolls x20  Therapeutic Exercise Activity 7: shld retract with ER 10x2  Therapeutic Exercise Activity 8: cervical rotation with OP 10\" H B x10      Assessment   Assessment:    Emphasis on relaxation techniques, cervical movement, generalized stretching, and pain relief. Limited cervical motion noted with posture deficits and head position deficits. Will be meeting with chronic pain " specialist for further assessment/treatment.     Plan:    Chronic pain specialist next session    OP EDUCATION:   Continue with current HEP    Goals:     1) Pt will report 0/10 pain at rest and with ADL's - PROGRESSING  2) Pt will be compliant with HEP - PROGRESSING  3) Pt will be independent and painfree lifting a coffee cup - ACHIEVED   4) NDI < 20% - PROGRESSING

## 2024-04-04 ENCOUNTER — TREATMENT (OUTPATIENT)
Dept: PHYSICAL THERAPY | Facility: CLINIC | Age: 74
End: 2024-04-04
Payer: COMMERCIAL

## 2024-04-04 ENCOUNTER — OFFICE VISIT (OUTPATIENT)
Dept: OPHTHALMOLOGY | Facility: CLINIC | Age: 74
End: 2024-04-04
Payer: COMMERCIAL

## 2024-04-04 DIAGNOSIS — R42 DIZZINESS: Primary | ICD-10-CM

## 2024-04-04 DIAGNOSIS — G89.29 OTHER CHRONIC PAIN: ICD-10-CM

## 2024-04-04 DIAGNOSIS — H25.813 COMBINED FORMS OF AGE-RELATED CATARACT OF BOTH EYES: ICD-10-CM

## 2024-04-04 PROCEDURE — 99213 OFFICE O/P EST LOW 20 MIN: CPT | Performed by: OPHTHALMOLOGY

## 2024-04-04 PROCEDURE — 97110 THERAPEUTIC EXERCISES: CPT | Mod: GP | Performed by: PHYSICAL THERAPIST

## 2024-04-04 ASSESSMENT — VISUAL ACUITY
OS_SC: 20/20
OD_SC: 20/25
METHOD: SNELLEN - LINEAR
OD_SC+: -1

## 2024-04-04 ASSESSMENT — ENCOUNTER SYMPTOMS
HEMATOLOGIC/LYMPHATIC NEGATIVE: 0
LOSS OF SENSATION IN FEET: 0
CARDIOVASCULAR NEGATIVE: 0
RESPIRATORY NEGATIVE: 0
OCCASIONAL FEELINGS OF UNSTEADINESS: 0
PSYCHIATRIC NEGATIVE: 0
GASTROINTESTINAL NEGATIVE: 0
EYES NEGATIVE: 0
MUSCULOSKELETAL NEGATIVE: 0
ENDOCRINE NEGATIVE: 0
NEUROLOGICAL NEGATIVE: 1
DEPRESSION: 0
CONSTITUTIONAL NEGATIVE: 0
ALLERGIC/IMMUNOLOGIC NEGATIVE: 0

## 2024-04-04 ASSESSMENT — EXTERNAL EXAM - RIGHT EYE: OD_EXAM: NORMAL

## 2024-04-04 ASSESSMENT — SLIT LAMP EXAM - LIDS
COMMENTS: NORMAL
COMMENTS: NORMAL

## 2024-04-04 ASSESSMENT — TONOMETRY
IOP_METHOD: GOLDMANN APPLANATION
OD_IOP_MMHG: 12
OS_IOP_MMHG: 12

## 2024-04-04 ASSESSMENT — CUP TO DISC RATIO
OD_RATIO: 0.35
OS_RATIO: 0.35

## 2024-04-04 ASSESSMENT — PAIN SCALES - GENERAL: PAINLEVEL: 0-NO PAIN

## 2024-04-04 ASSESSMENT — EXTERNAL EXAM - LEFT EYE: OS_EXAM: NORMAL

## 2024-04-04 NOTE — ASSESSMENT & PLAN NOTE
Stable moderate appearing cataract but remains non significant visually. Will continue to monitor with serial exam.

## 2024-04-04 NOTE — Clinical Note
No new eye changes or findings consistent with her previous episode of dizziness. Do not think eyes or visual pathways were playing any role. Thanks!

## 2024-04-04 NOTE — ASSESSMENT & PLAN NOTE
No signs of any eye abnormality or change consistent with her discrete episode of dizziness. Reassured patient and encouraged to follow up with her PCP as scheduled and any specialist appointments that were set.

## 2024-04-04 NOTE — PROGRESS NOTES
Assessment/Plan   Problem List Items Addressed This Visit       Combined forms of age-related cataract of both eyes     Stable moderate appearing cataract but remains non significant visually. Will continue to monitor with serial exam.          Dizziness - Primary     No signs of any eye abnormality or change consistent with her discrete episode of dizziness. Reassured patient and encouraged to follow up with her PCP as scheduled and any specialist appointments that were set.             Provided reassurance regarding above diagnoses and care received in the office visit today. Discussed outcomes and options along with the importance of treatment compliance. Understands the importance of any follow up visits. Patient instructed to call/communicate with our office if any new issues, questions, or concerns.     Will plan to see back as scheduled at end of year for her full examination or sooner PRN

## 2024-04-04 NOTE — PATIENT INSTRUCTIONS
Thank you so much for choosing me to provide your care today!    If you were dilated your vision may remain blurry   or light sensitive for several hours.    The nature of eye and vision problems can require frequent follow up, please make every effort to adhere to any future appointments.    If you have any issues, questions, or concerns,   please do not hesitate to reach out.    If you receive a survey in regards to your care today, please mention any exceptional care my office staff and/or technicians provided.    You can reach our office at this number:  [phone]

## 2024-04-04 NOTE — PROGRESS NOTES
Physical Therapy Treatment    Patient Name: Christiana Ferguson  MRN: [id number]  Today's Date: 4/4/2024  Time Calculation  Start Time: 1030  Stop Time: 1109  Time Calculation (min): 39 min  PT Therapeutic Procedures Time Entry  Therapeutic Exercise Time Entry: 39    Insurance:  Visit number: 1 of 7 (of second auth)  Authorization info: 7 visits until 6/30/2024  Insurance Type: [facility] / Plan: [employer] AGED BLIND AND DISAB[employer] / Product Type: *No Product type* /     Current Problem   1. Other chronic pain  Follow Up In Physical Therapy          Subjective   General   Pt reports she is continuing to get neck pain that is extending into her right shoulder. Finds after taking medication since her Greyhound ride her pain levels have decreased.    Precautions: None  Pain 4/10  Post Treatment Pain Level 4/10    Objective   Cervical spine hypomobility, increased distally     Treatments:  Therapeutic Exercise  Therapeutic Exercise Performed: Yes  Therapeutic Exercise Activity 1: UBE F/B 2.5' each  Therapeutic Exercise Activity 2: Scap retraction GTB 2x10  Therapeutic Exercise Activity 3: UT towel stretch 30 seconds x 3 ea R/L  Therapeutic Exercise Activity 4: L5 flexion slides, 2x10   Therapeutic Exercise Activity 5: seated cervical retraction 2x10  Therapeutic Exercise Activity 6: Scaption, 2x10  Therapeutic Exercise Activity 7: B ER in neutral, 2x10      Assessment   Assessment:   Improved tolerance to all there ex this date with no pain limiting her performance. Negative crepitus and increased AROM of right shoulder.     Plan: Gentle mobility     OP EDUCATION: Importance of doing HEP    Goals: see med

## 2024-04-05 ENCOUNTER — TELEMEDICINE (OUTPATIENT)
Dept: PHARMACY | Facility: HOSPITAL | Age: 74
End: 2024-04-05
Payer: COMMERCIAL

## 2024-04-05 ENCOUNTER — HOSPITAL ENCOUNTER (OUTPATIENT)
Dept: RADIOLOGY | Facility: HOSPITAL | Age: 74
Discharge: HOME | End: 2024-04-05
Payer: COMMERCIAL

## 2024-04-05 VITALS — BODY MASS INDEX: 22.82 KG/M2 | WEIGHT: 124 LBS | HEIGHT: 62 IN

## 2024-04-05 DIAGNOSIS — R42 DIZZINESS: ICD-10-CM

## 2024-04-05 DIAGNOSIS — Z12.31 ENCOUNTER FOR SCREENING MAMMOGRAM FOR MALIGNANT NEOPLASM OF BREAST: ICD-10-CM

## 2024-04-05 PROCEDURE — 77067 SCR MAMMO BI INCL CAD: CPT | Performed by: STUDENT IN AN ORGANIZED HEALTH CARE EDUCATION/TRAINING PROGRAM

## 2024-04-05 PROCEDURE — 77063 BREAST TOMOSYNTHESIS BI: CPT | Performed by: STUDENT IN AN ORGANIZED HEALTH CARE EDUCATION/TRAINING PROGRAM

## 2024-04-05 PROCEDURE — 77067 SCR MAMMO BI INCL CAD: CPT

## 2024-04-06 DIAGNOSIS — M81.0 AGE RELATED OSTEOPOROSIS, UNSPECIFIED PATHOLOGICAL FRACTURE PRESENCE: ICD-10-CM

## 2024-04-06 RX ORDER — ALENDRONATE SODIUM 70 MG/1
70 TABLET ORAL
Qty: 12 TABLET | Refills: 1 | Status: SHIPPED | OUTPATIENT
Start: 2024-04-06 | End: 2024-09-21

## 2024-04-06 NOTE — PROGRESS NOTES
Patient ID: Christiana Ferguson is a 73 y.o. female who presents for Dizziness.    Referring Provider: Kaitlynn Reyes MD PhD  PCP: Kaitlynn Reyes MD PhD   Last visit with PCP: 3/25/2024   Next visit with PCP: Needs to schedule.  Pt informed     Subjective     HPI  Patient doesn't endorse any dizziness at this time. She states that it was from the hydrochlorothiazide prescribed from Cardiologist at [facility]. When trying to explain to PCP, PCP changed to amlodipine 5mg QD and scheduled an eye appointment. Per the ophthalmology note, no optic cause for the dizziness. Patient states that she is not having any problems since starting the amlodipine.      Review of Systems      Objective     There were no vitals taken for this visit.   BP Readings from Last 4 Encounters:   03/25/24 (!) 150/92   02/27/24 120/68   12/08/23 130/82   12/06/23 120/78      There were no vitals filed for this visit.     Labs  Lab Results   Component Value Date    BILITOT 0.2 10/21/2023    CALCIUM 9.4 10/21/2023    CO2 24 10/21/2023     10/21/2023    CREATININE 0.90 10/21/2023    GLUCOSE 95 10/21/2023    ALKPHOS 68 10/21/2023    K 4.6 10/21/2023    PROT 7.4 10/21/2023     10/21/2023    AST 21 10/21/2023    ALT 11 10/21/2023    BUN 17 10/21/2023    ANIONGAP 14 10/21/2023    MG 2.20 02/22/2024    PHOS 4.3 02/22/2024    ALBUMIN 4.5 10/21/2023    LIPASE 13 (L) 02/01/2020     Lab Results   Component Value Date    TRIG 63 10/21/2023    CHOL 181 10/21/2023    LDLCALC 101 10/21/2023    HDL 67.0 10/21/2023     Lab Results   Component Value Date    HGBA1C 6.1 03/25/2024       Current Outpatient Medications   Medication Instructions    acetaminophen (Tylenol) 325 mg tablet 2 tablets, oral, Every 6 hours PRN    alendronate (FOSAMAX) 70 mg, oral, Every 7 days, Take in the morning with a full glass of water, on an empty stomach, and do not take anything else by mouth or lie down for the next 30 min.    amLODIPine (NORVASC) 5 mg, oral, Daily     busPIRone (Buspar) 10 mg tablet 1 tablet, oral, 2 times daily    busPIRone (Buspar) 15 mg tablet 1 tablet, oral, 3 times daily    calcium carbonate EX (Tums E-X) 300 mg (750 mg) chewable tablet 1 tablet, oral, Daily    celecoxib (CeleBREX) 100 mg capsule 1 capsule, oral, 2 times daily, With food    cholecalciferol (Vitamin D-3) 5,000 Units tablet as directed Orally    clonazePAM (KlonoPIN) 0.5 mg tablet 1 tablet, oral, 2 times daily PRN    dicyclomine (Bentyl) 10 mg capsule 1 capsule, oral, 3 times daily PRN    divalproex (Depakote ER) 250 mg 24 hr tablet 1-2 tablets, oral, Nightly PRN    gabapentin (NEURONTIN) 100 mg, oral, 3 times daily    ibuprofen 800 mg, oral, Every 6 hours PRN    lidocaine (Lidoderm) 5 % patch 1 patch, transdermal, Daily    losartan (Cozaar) 100 mg tablet Take 1 tablet by mouth once daily    magnesium oxide-Mg AA chelate (Magnesium, oxide/AA chelate,) 300 mg capsule 1 capsule, oral, Daily, With a meal    risedronate (Actonel) 150 mg tablet PLEASE SEE ATTACHED FOR DETAILED DIRECTIONS<BR>    traMADol (Ultram) 50 mg tablet 1 tablet, oral, Every 6 hours PRN    TURMERIC ORAL 1 capsule, oral, 2 times daily    zolpidem (AMBIEN) 20 mg, oral, Nightly PRN    zolpidem (AMBIEN) 5 mg, oral, Nightly PRN         Drug Interactions;  None at time of review    Assessment/Plan   Problem List Items Addressed This Visit       Dizziness   Patient is doing well now that she has stopped the hydrochlorothiazide. She states she is no longer dizzy. She is currently on 2 blood pressure medications with no complaints or symptoms of low blood pressure.    Continue all medications, and check blood pressure at home  Monitor for any CNS depression due gabapentin, zolpidem, buspar, and clonazepam      Follow-up: as needed per patient request    Ngozi Monsalve, PharmD    Continue all meds under the continuation of care with the referring provider and clinical pharmacy team.

## 2024-04-07 PROCEDURE — RXMED WILLOW AMBULATORY MEDICATION CHARGE

## 2024-04-08 ENCOUNTER — OFFICE VISIT (OUTPATIENT)
Dept: PAIN MEDICINE | Facility: CLINIC | Age: 74
End: 2024-04-08
Payer: COMMERCIAL

## 2024-04-08 VITALS
HEART RATE: 101 BPM | DIASTOLIC BLOOD PRESSURE: 98 MMHG | WEIGHT: 124 LBS | RESPIRATION RATE: 22 BRPM | OXYGEN SATURATION: 97 % | BODY MASS INDEX: 23.41 KG/M2 | HEIGHT: 61 IN | SYSTOLIC BLOOD PRESSURE: 152 MMHG

## 2024-04-08 DIAGNOSIS — M81.0 OSTEOPOROSIS, UNSPECIFIED OSTEOPOROSIS TYPE, UNSPECIFIED PATHOLOGICAL FRACTURE PRESENCE: ICD-10-CM

## 2024-04-08 DIAGNOSIS — M47.812 CERVICAL SPONDYLOSIS WITHOUT MYELOPATHY: Primary | ICD-10-CM

## 2024-04-08 DIAGNOSIS — M79.7 FIBROMYALGIA: ICD-10-CM

## 2024-04-08 DIAGNOSIS — G89.29 OTHER CHRONIC PAIN: ICD-10-CM

## 2024-04-08 DIAGNOSIS — M54.2 NECK PAIN: ICD-10-CM

## 2024-04-08 PROCEDURE — 1125F AMNT PAIN NOTED PAIN PRSNT: CPT | Performed by: ANESTHESIOLOGY

## 2024-04-08 PROCEDURE — 1159F MED LIST DOCD IN RCRD: CPT | Performed by: ANESTHESIOLOGY

## 2024-04-08 PROCEDURE — 4004F PT TOBACCO SCREEN RCVD TLK: CPT | Performed by: ANESTHESIOLOGY

## 2024-04-08 PROCEDURE — 3080F DIAST BP >= 90 MM HG: CPT | Performed by: ANESTHESIOLOGY

## 2024-04-08 PROCEDURE — 99214 OFFICE O/P EST MOD 30 MIN: CPT | Performed by: ANESTHESIOLOGY

## 2024-04-08 PROCEDURE — 1160F RVW MEDS BY RX/DR IN RCRD: CPT | Performed by: ANESTHESIOLOGY

## 2024-04-08 PROCEDURE — 3077F SYST BP >= 140 MM HG: CPT | Performed by: ANESTHESIOLOGY

## 2024-04-08 PROCEDURE — 99204 OFFICE O/P NEW MOD 45 MIN: CPT | Performed by: ANESTHESIOLOGY

## 2024-04-08 ASSESSMENT — PATIENT HEALTH QUESTIONNAIRE - PHQ9
1. LITTLE INTEREST OR PLEASURE IN DOING THINGS: NOT AT ALL
2. FEELING DOWN, DEPRESSED OR HOPELESS: NOT AT ALL
SUM OF ALL RESPONSES TO PHQ9 QUESTIONS 1 & 2: 0

## 2024-04-08 ASSESSMENT — ENCOUNTER SYMPTOMS
NECK PAIN: 1
ACTIVITY CHANGE: 1
NECK STIFFNESS: 1

## 2024-04-08 ASSESSMENT — LIFESTYLE VARIABLES
HOW MANY STANDARD DRINKS CONTAINING ALCOHOL DO YOU HAVE ON A TYPICAL DAY: 1 OR 2
HOW OFTEN DO YOU HAVE SIX OR MORE DRINKS ON ONE OCCASION: NEVER
HOW OFTEN DO YOU HAVE A DRINK CONTAINING ALCOHOL: MONTHLY OR LESS
TOTAL SCORE: 0
SKIP TO QUESTIONS 9-10: 1
AUDIT-C TOTAL SCORE: 1

## 2024-04-08 ASSESSMENT — PAIN SCALES - GENERAL
PAINLEVEL: 4
PAINLEVEL_OUTOF10: 4

## 2024-04-08 ASSESSMENT — PAIN - FUNCTIONAL ASSESSMENT: PAIN_FUNCTIONAL_ASSESSMENT: 0-10

## 2024-04-08 ASSESSMENT — PAIN DESCRIPTION - DESCRIPTORS: DESCRIPTORS: ACHING

## 2024-04-08 NOTE — PROGRESS NOTES
The patient is a 73-year-old female with neck pain.  She is neck pain for many years.  The pain is worse with range of motion of her neck as well as with the use of her arms.  The pain radiates into the back of her head.  The pain is interfering with her activities as well as her sleep.  She is not experiencing radiation into her arms.  She has benefited from chiropractic treatments, acupuncture and massotherapy in the past.  We reviewed the results of the imaging of her spine.    Review of Systems   Constitutional:  Positive for activity change.   Musculoskeletal:  Positive for neck pain and neck stiffness.   All other systems reviewed and are negative.    GENERAL: alert and appropriate, in no distress, well-hydrated, well nourished, interactive         SKIN: no rash noted         HEAD: normocephalic, no abnormality or lesion noted         EYES: no injection and visual acuity is grossly normal         EARS: external ears normal, no mastoid tenderness         NOSE: external nose normal without rhinorrhea         OROPHARYNX: moist mucus membranes, no tonsillar hypertrophy/exudate, uvula midline and pharynx non-erythematous, lips, teeth and gums are without obvious lesion         NECK: Reduced ROM, no cervical LNs noted         RESPIRATORY: breathing non-labored and no grunting/flaring/retractions         CHEST: equal chest rise with normal respiratory effort         ABDOMEN: soft and non-tender         BACK: back normal in appearance, cervical and lumbar spine with reduced ROM         EXTREMITIES: strength intact         NEUROLOGIC: gait antalgic, Yulisa sign negative, Spurling sign reproduced pain, sensation grossly intact    Assessment and Plan    -Chronicity--chronic spinal pain    -Diagnostics--we reviewed her MRI and x-rays of the cervical spine    -Pharmacologic--no change    -Psychologic--no need for psychologic intervention from my standpoint    -Physical--we discussed the importance of physical therapy and  exercise.  We discussed avoidance and modification techniques.  I would like the patient to have a consultation with Dr. Veliz for chiropractic treatment to include a massotherapy and acupuncture.    -Intervention--no interventions planned at this time.    I spent time educating the patient on the condition including the treatment and the prognosis.  I invited the patient to call at anytime with any questions.

## 2024-04-11 ENCOUNTER — TREATMENT (OUTPATIENT)
Dept: PHYSICAL THERAPY | Facility: CLINIC | Age: 74
End: 2024-04-11
Payer: COMMERCIAL

## 2024-04-11 ENCOUNTER — APPOINTMENT (OUTPATIENT)
Dept: RADIOLOGY | Facility: HOSPITAL | Age: 74
End: 2024-04-11
Payer: COMMERCIAL

## 2024-04-11 DIAGNOSIS — G89.29 OTHER CHRONIC PAIN: ICD-10-CM

## 2024-04-11 PROCEDURE — 97110 THERAPEUTIC EXERCISES: CPT | Mod: GP | Performed by: PHYSICAL THERAPIST

## 2024-04-11 NOTE — PROGRESS NOTES
"Physical Therapy Treatment    Patient Name: Christiana Ferguson  MRN: [id number]  Today's Date: 4/11/2024  Time Calculation  Start Time: 0950  Stop Time: 1030  Time Calculation (min): 40 min  PT Therapeutic Procedures Time Entry  Therapeutic Exercise Time Entry: 40    Insurance:  Visit number: 2 of 7 (of second auth)  Authorization info: 7 visits until 6/30/2024  Ins[facility] Type: [facility] / Product Type: *No Product type* /      Current Problem   1. Other chronic pain  Follow Up In Physical Therapy          Subjective   General   Right side of neck (pulling) and shoulder has been cramping up, making it harder to sleep on this side. Feels it mainly in \"the deltoid.\"     Precautions: None  Pain 5/10  Post Treatment Pain Level about the same    Objective   Right GHJ with hypomobility from guarding in all directions     Treatments:  Therapeutic Exercise Performed: Yes  Therapeutic Exercise Activity 1: UBE F/B 2.5' each  Therapeutic Exercise Activity 3: UT towel stretch 30 seconds x 3  Therapeutic Exercise Activity 4: L5 flexion slides, 2x10   Therapeutic Exercise Activity 5: BOSU pushes, 2x10  Therapeutic Exercise Activity 6: Pulleys into flexion, x2 minutes   Therapeutic Exercise Activity 2: Physio roll out, x2 minutes   Therapeutic Exercise Activity 2: Scap retraction GTB 2x10  Therapeutic Exercise Activity 2: Sh ext GTB 2x10  Therapeutic Exercise Activity 7: B ER in neutral GTB, 2x10  Therapeutic Exercise Activity 2: Sh punches GTB, 2x10      Assessment   Assessment:   Able to progress to further closed chain strengthening for UE without increases in pain/symptoms. Lower to mid level exercises easier versus overhead exercises.     Plan: Add strength as able    OP EDUCATION: Sleeping positions. Provided green and for home    Goals:  see eval   "

## 2024-04-12 ENCOUNTER — ANCILLARY PROCEDURE (OUTPATIENT)
Dept: VASCULAR MEDICINE | Facility: CLINIC | Age: 74
End: 2024-04-12
Payer: COMMERCIAL

## 2024-04-12 ENCOUNTER — PHARMACY VISIT (OUTPATIENT)
Dept: PHARMACY | Facility: CLINIC | Age: 74
End: 2024-04-12
Payer: MEDICAID

## 2024-04-12 DIAGNOSIS — R42 DIZZINESS: ICD-10-CM

## 2024-04-12 PROCEDURE — 93880 EXTRACRANIAL BILAT STUDY: CPT

## 2024-04-12 PROCEDURE — 93880 EXTRACRANIAL BILAT STUDY: CPT | Performed by: SURGERY

## 2024-04-17 ENCOUNTER — TELEPHONE (OUTPATIENT)
Dept: RHEUMATOLOGY | Facility: CLINIC | Age: 74
End: 2024-04-17
Payer: COMMERCIAL

## 2024-04-17 NOTE — TELEPHONE ENCOUNTER
----- Message from Dionisio Curtis MD sent at 4/16/2024  6:22 PM EDT -----  Send copy of xray report to patient.

## 2024-04-18 ENCOUNTER — TREATMENT (OUTPATIENT)
Dept: PHYSICAL THERAPY | Facility: CLINIC | Age: 74
End: 2024-04-18
Payer: COMMERCIAL

## 2024-04-18 DIAGNOSIS — G89.29 OTHER CHRONIC PAIN: ICD-10-CM

## 2024-04-18 PROCEDURE — 97110 THERAPEUTIC EXERCISES: CPT | Mod: GP | Performed by: PHYSICAL THERAPIST

## 2024-04-18 NOTE — PROGRESS NOTES
Physical Therapy Treatment    Patient Name: Christiana Ferguson  MRN: [id number]  Today's Date: 4/18/2024  Time Calculation  Start Time: 1025  Stop Time: 1105  Time Calculation (min): 40 min  PT Therapeutic Procedures Time Entry  Therapeutic Exercise Time Entry: 40    Insurance:  Visit number: 3 of 7 (of second auth)  Authorization info: 7 visits until 6/30/2024  Ins[facility] Type: [facility] / Plan: [employer] AGED BLIND AND DISABLED / Product Type: *No Product type* /    Current Problem   1. Other chronic pain  Follow Up In Physical Therapy          Subjective   General   [facility] and got acupuncture, chiropractor, and massage of neck through pain management. She finds the spasms in her neck and right shoulder are calming down.     Precautions: None  Pain 4/10  Post Treatment Pain Level 4/10    Objective   Richardson-Antoino: Positive on right, Compression: Negative     Treatments:  Therapeutic Exercise Performed: Yes  Therapeutic Exercise Activity 1: UBE F/B 2.5' each  Therapeutic Exercise Activity 3: UT towel stretch 30 seconds x 3  Therapeutic Exercise Activity 4: L5 flexion slides, 3x10   Therapeutic Exercise Activity 5: BOSU pushes, 2x10  Therapeutic Exercise Activity 6: Pulleys into flexion, x2 minutes   Therapeutic Exercise Activity 2: Physio roll out, x2 minutes   Therapeutic Exercise Activity 2: Scap retraction GTB 2x10  Therapeutic Exercise Activity 2: Sh ext GTB 2x10  Therapeutic Exercise Activity 2: Sh punches GTB, 2x10      Assessment   Assessment:   Much improved tolerance to all exercises with only mild right shoulder discomfort, more likely impingement related. Symptoms have been centralizing to cervical spine to allow patient to perform more functional activities.       Plan: Cont with HEP and attending spine doctors    OP EDUCATION: Pain management techniques     Goals: see med

## 2024-06-03 NOTE — PROGRESS NOTES
"Chief complaint: Fibromyalgia, neck pain    Dear Kaitlynn Fernandez MD PhD    I had the pleasure of seeing your patient, Christiana Ferguson, in clinic today. As you know, She is a pleasant 74 y.o. right handed woman with past medical history significant for HTN, osteoporosis, primary hyperparathyroidism, cataract, who presents for evaluation of fibromyalgia and neck pain.     TIMELINE OF COMPLAINT(S):    She has pain in many different areas, remembering that she \"always hurts\" for many decades, but got especially severe in the past 15 years, with no inciting or traumatic event.  There was some whiplash injuries, falls over ice over the years, but nothing severe.      At this point, her right shoulder and neck, right arm is the most bothersome.      She also has right anterior medial knee pain that began a couple months ago.    Pain management note from Dr. Rodriguez 4/8/2024 personally reviewed today.  He recommended conservative management including physical therapy, acupuncture, traction and she has been doing this for the past couple of months.    PCP note 3/25/2024 personally reviewed today.    CTS R and trigger finger release surgeries last year helped.  She had an EMG done at an outside facility, she will try to get me the report.    Overall, she has chronic pain, but manages it generally, except for when she has a flareup.  This past April she had a big flareup after she took a [employer] trip to [place]      Pain:  LOCATION-right arm, shoulder blade posterior and lateral shoulder, R neck, right knee atnerior medial aspect  RADIATION-  CONSTANT or INTERMITTENT- Constant  SEVERITY/JCWPORYI-5-9/10  QUALITY- aching, burning, dull, pressure, sharp, and throbbing, pressure  WEAKNESS-left hip  NUMBNESS/TINGLING-bilateral hands  ASSOCIATED WITH- Crackling and Snapping  EXACERBATED BY-overdoing activities, not sleeping  BETTER WITH-heat, massage, traction manipulation, medications, rest  TRIED- Tylenol helps sometimes    " "  Anti-Inflammatories: Ibuprofen helps, previously turmeric but stopped taking it for some reason it did help, previously Celebrex not sure if it helped      Muscle relaxants:      Anti-depressants:      Neuroleptics: Gabapentin 100 mg BID      LDN:    PHYSICAL THERAPY: Yes, March and April 2024, general strength and stretching, helps a little. Doing HEP.   CHIROPRACTIC MANIPULATION: Yes  TENS unit: No  ACUPUNCTURE TREATMENTS: Yes, maybe helped  DEEP TISSUE MASSAGE THERAPY: No  OSTEOPATHIC MANIPULATION THERAPY: No    EMG/NCS: Yes Dr. Daisy DEXTER, in the past  year    INJECTIONS: one injection \"near shoulder blade\", reports of previous LUIS's \"helping\" but pt didn't remember it helping.     IMAGING:    === 02/29/24 ===    XR KNEE 3 VIEWS RIGHT    - Impression -  No evidence for acute osseous abnormality. No significant interval  change, although there may be slight narrowing of the lateral  compartment.    Left elbow x-ray 11/23/2022: Mild degenerative changes    Left shoulder x-ray 11/23/2022: Mild degenerative changes at the acromioclavicular joint    Right shoulder x-ray 10/10/2022: Mild glenohumeral osteoarthritis    Right hand x-ray 10/10/2022: Osteoporosis, no other pathologies    Bilateral hand x-rays 8/4/2022: No acute osseous abnormalities.  Previously seen nondisplaced fracture through the right thumb not well-seen.    MRI cervical spine 4/12/2021:    IMPRESSION:     1. Diffuse cervical disc/joint degeneration.     2.  Significant bilateral C5-6 and left C4-5 and right C6-7 bony foramina   narrowing.     3. Patent remaining cervical canal and foramen.     X-ray thoracic and lumbar spine 9/5/2019:  Vertebral body heights:  Mild superior endplate compression deformity of L2. Remainder of vertebral  body heights maintained.     Disc spaces:  Disc space narrowing, endplate irregularity and osteophyte formation most  pronounced L4-5 from chronic degenerative change.    Neck xray pending  Lab Results   Component " Value Date    HGBA1C 6.1 03/25/2024       FUNCTIONAL HISTORY: The patient is independent in all ADLs, mobility, and driving. The patient uses a cane for long distances jkl;'    SH:  Lives in: [place]  Lives with: Alone  Occupation: Retired teacher  Tobacco: 1 pack every 2 weeks  Alcohol: On holidays, few times per year  Drugs: No    ROS: The patient denies any bowel or bladder incontinence/accidents, night sweats, fevers, chills, recent significant weight loss. A 14 point review of systems was reviewed with the patient and is as above and otherwise negative.  ROS questionnaire positive for numbness/tingling, weakness, difficulty walking, muscle pain/tenderness, spasms, joint pain, lower back pain, anxiety, sleep disturbances, limited range of motion      Physical Exam     PHYSICAL EXAM    GEN - Alert, well-developed, well-nourished, no acute distress  PSYCH - Cooperative, appropriate mood and affect  HEENT - NC/AT  RESP - Non-labored respirations, equal expansion  CV - warm and well-perfused, No cyanosis or edema in extremities.  ABD- soft, ND  SKIN - No rash.    NECK/EXTR- Cervical ROM is full with forward flexion, extension, rotation, and side bending with mild provocation of the endpoints with side rotation and bending to the right. Spurlings and Lhermitte's negative. No tenderness of the cervical spinous processes.  There was tenderness of the cervical paraspinal muscles and trapezei musculature as well as the scapular musculature  Right worse than left.  Scapulae are symmetrical without evidence of medial or lateral winging.    Shoulder diminished at the endpoints of forward flexion, abduction, and internal rotation with provocation of pain.  Positive Neer test, positive Richardson test, positive empty can test.  Rotator cuff muscles were weak due to pain.      Knee: No warmth, effusion, or erythema.  No popliteal fullness.  No anterior, posterior, medial or lateral instability.  There was significant pes  anserine tenderness on the right.  There is no pain with hyperflexion of the knee.  There is no pain with varus or valgus stressing of the knee during flexion and extension.  There is no crepitus.  There is no medial or lateral joint line tenderness.    NEURO -   UE strength 5/5 -  including shoulder abduction (4/5 on the right), biceps, triceps, wrist extensors, finger flexors, interossei, and    LE strength 5/5 -  including hip flexors (5 -/5 bilaterally), knee flexors, knee extensors, ankle dorsiflexors, ankle plantar flexors, and EHL   Sensation - intact to light touch in bilateral lower and upper extremities.   Reflexes - 2+ biceps, brachioradialis, triceps, 1+ patellar and Achilles reflexes bilaterally No Clonus, No Babinski, Mccain's negative bilaterally  GAIT - Normal base, normal stride length, non-antalgic. Able to toe walk and heel walk without difficulty.  Right shoulder droop compared to left    IMPRESSION:    This is a pleasant 74 y.o. right handed woman with past medical history significant for HTN, osteoporosis, primary hyperparathyroidism, cataract, who presents for evaluation of fibromyalgia and neck pain.  Physical exam is reassuring for lack of neurologic compromise.  Symptoms of physical exam findings in this complex patient are likely multifactorial in nature due to combination of right rotator cuff pathology, cervical spondylosis and reactive myofascial pain/tension, pes anserinus bursitis on the right, exacerbated by underlying deconditioning and fibromyalgia.        -Patient Education: Extensive time was spent educating the patient on relevant anatomy, clinical findings and imaging, as well as discussing the potential diagnoses as discussed above.   -Medrol Dosepak ordered. The medication mechanism, side effects as well as the risks, benefits, and alternatives were discussed. Goals of therapy discussed. The patient expressed understanding of this.    -Try Tumeric 1000 mg per day  +curcumin daily    -Either stop gabapentin or go up on gabapentin to 100 mg 3 times per day, and slowly over time by 100 mg at a time every few days until you are at 300 mg 3 times per day, eventually 600 mg 3 times per day  -Consider other medications in the future  -Try to get me your recent EMG report  -Consider injections: Trigger point injections (handout provided), ultrasound-guided subacromial injection on the right, pes anserinus bursitis injection, referral for neck injections  -Exercises provided for the knee and shoulder  -Follow-up in 6 to 8 weeks        The patient expressed understanding and agreement with the assessment and plan. Patient encouraged to contact us should they have any questions, concerns, or any changes in symptoms.     Thank you for allowing me to participate in the care of your patient.      ** Dictated with voice recognition software, please forgive any errors in grammar and/or spelling **

## 2024-06-04 ENCOUNTER — TELEPHONE (OUTPATIENT)
Dept: PHYSICAL MEDICINE AND REHAB | Facility: CLINIC | Age: 74
End: 2024-06-04

## 2024-06-04 ENCOUNTER — CONSULT (OUTPATIENT)
Dept: PHYSICAL MEDICINE AND REHAB | Facility: CLINIC | Age: 74
End: 2024-06-04
Payer: COMMERCIAL

## 2024-06-04 ENCOUNTER — OFFICE VISIT (OUTPATIENT)
Dept: RHEUMATOLOGY | Facility: CLINIC | Age: 74
End: 2024-06-04
Payer: COMMERCIAL

## 2024-06-04 VITALS
HEART RATE: 82 BPM | SYSTOLIC BLOOD PRESSURE: 140 MMHG | OXYGEN SATURATION: 98 % | HEIGHT: 62 IN | BODY MASS INDEX: 24.51 KG/M2 | DIASTOLIC BLOOD PRESSURE: 80 MMHG | WEIGHT: 133.2 LBS

## 2024-06-04 VITALS
DIASTOLIC BLOOD PRESSURE: 77 MMHG | TEMPERATURE: 97.3 F | HEART RATE: 77 BPM | WEIGHT: 132 LBS | BODY MASS INDEX: 24.29 KG/M2 | SYSTOLIC BLOOD PRESSURE: 126 MMHG | HEIGHT: 62 IN

## 2024-06-04 DIAGNOSIS — M81.0 AGE-RELATED OSTEOPOROSIS WITHOUT CURRENT PATHOLOGICAL FRACTURE: Primary | ICD-10-CM

## 2024-06-04 DIAGNOSIS — R20.2 LEFT HAND PARESTHESIA: ICD-10-CM

## 2024-06-04 DIAGNOSIS — M54.2 NECK PAIN: ICD-10-CM

## 2024-06-04 DIAGNOSIS — M79.7 FIBROMYALGIA: ICD-10-CM

## 2024-06-04 DIAGNOSIS — M79.601 RIGHT ARM PAIN: ICD-10-CM

## 2024-06-04 DIAGNOSIS — M67.911 ROTATOR CUFF DYSFUNCTION, RIGHT: ICD-10-CM

## 2024-06-04 DIAGNOSIS — M70.51 PES ANSERINUS BURSITIS OF RIGHT KNEE: ICD-10-CM

## 2024-06-04 DIAGNOSIS — M79.18 MYOFASCIAL PAIN: ICD-10-CM

## 2024-06-04 DIAGNOSIS — G89.29 CHRONIC PAIN OF MULTIPLE SITES: ICD-10-CM

## 2024-06-04 DIAGNOSIS — M47.22 CERVICAL SPONDYLOSIS WITH RADICULOPATHY: ICD-10-CM

## 2024-06-04 DIAGNOSIS — M54.12 CERVICAL RADICULOPATHY: ICD-10-CM

## 2024-06-04 DIAGNOSIS — M79.7 FIBROMYALGIA SYNDROME: ICD-10-CM

## 2024-06-04 DIAGNOSIS — M79.641 RIGHT HAND PAIN: Primary | ICD-10-CM

## 2024-06-04 DIAGNOSIS — R52 CHRONIC PAIN OF MULTIPLE SITES: ICD-10-CM

## 2024-06-04 PROCEDURE — RXMED WILLOW AMBULATORY MEDICATION CHARGE

## 2024-06-04 PROCEDURE — 3077F SYST BP >= 140 MM HG: CPT | Performed by: INTERNAL MEDICINE

## 2024-06-04 PROCEDURE — 4004F PT TOBACCO SCREEN RCVD TLK: CPT | Performed by: INTERNAL MEDICINE

## 2024-06-04 PROCEDURE — 99214 OFFICE O/P EST MOD 30 MIN: CPT | Performed by: INTERNAL MEDICINE

## 2024-06-04 PROCEDURE — 3079F DIAST BP 80-89 MM HG: CPT | Performed by: INTERNAL MEDICINE

## 2024-06-04 PROCEDURE — 99205 OFFICE O/P NEW HI 60 MIN: CPT | Performed by: PHYSICAL MEDICINE & REHABILITATION

## 2024-06-04 PROCEDURE — 1159F MED LIST DOCD IN RCRD: CPT | Performed by: INTERNAL MEDICINE

## 2024-06-04 PROCEDURE — 1125F AMNT PAIN NOTED PAIN PRSNT: CPT | Performed by: INTERNAL MEDICINE

## 2024-06-04 RX ORDER — DICLOFENAC SODIUM 10 MG/G
GEL TOPICAL
COMMUNITY
Start: 2024-03-01

## 2024-06-04 RX ORDER — GABAPENTIN 100 MG/1
100 CAPSULE ORAL 3 TIMES DAILY
Qty: 270 CAPSULE | Refills: 1 | Status: SHIPPED | OUTPATIENT
Start: 2024-06-04 | End: 2024-09-02

## 2024-06-04 RX ORDER — METHYLPREDNISOLONE 4 MG/1
TABLET ORAL
Qty: 21 TABLET | Refills: 0 | Status: SHIPPED | OUTPATIENT
Start: 2024-06-04

## 2024-06-04 ASSESSMENT — ROUTINE ASSESSMENT OF PATIENT INDEX DATA (RAPID3)
PICK_CLOTHES_OFF_FLOOR: WITH SOME DIFFICULTY
IN_OUT_TRANSPORT: WITH SOME DIFFICULTY
SUM OF QUESTIONS A TO J: 7
WASH_DRY_BODY: WITHOUT ANY DIFFICULTY
IN_OUT_BED: WITH SOME DIFFICULTY
WALK_KILOMETERS: WITH MUCH DIFFICULTY
ON A SCALE OF ONE TO TEN, HOW MUCH PAIN HAVE YOU HAD BECAUSE OF YOUR CONDITION OVER THE PAST WEEK?: 5.5
SEVERITY_SCORE: MODERATE SEVERITY (MS)
GOOD_NIGHTS_SLEEP: WITH MUCH DIFFICULTY
FEELINGS_DEPRESSION: WITHOUT ANY DIFFICULTY
FN_SCORE: 2.3
DRESS_YOURSELF: WITH SOME DIFFICULTY
FEELINGS_ANXIETY_NERVOUS: WITH SOME DIFFICULTY
ON A SCALE OF ONE TO TEN, CONSIDERING ALL THE WAYS IN WHICH ILLNESS AND HEALTH CONDITIONS MAY AFFECT YOU AT THIS TIME, PLEASE INDICATE BELOW HOW YOU ARE DOING:: 1
ON A SCALE OF ONE TO TEN, HOW MUCH PAIN HAVE YOU HAD BECAUSE OF YOUR CONDITION OVER THE PAST WEEK?: 5.5
TOTAL RAPID3 SCORE: 8.8
WEIGHTED_TOTAL_SCORE: 2.93
LIFT_CUP_TO_MOUTH: WITHOUT ANY DIFFICULTY
WALK_FLAT_GROUND: WITHOUT ANY DIFFICULTY
PARTIPATE_RECREATIONAL_ACTIVITIES: WITH SOME DIFFICULTY
TURN_FAUCETS_OFF: WITHOUT ANY DIFFICULTY
SEVERITY_SCORE: 0
ON A SCALE OF ONE TO TEN, CONSIDERING ALL THE WAYS IN WHICH ILLNESS AND HEALTH CONDITIONS MAY AFFECT YOU AT THIS TIME, PLEASE INDICATE BELOW HOW YOU ARE DOING:: 1

## 2024-06-04 ASSESSMENT — JOINT PAIN
TOTAL NUMBER OF SWOLLEN JOINTS: 0
TOTAL NUMBER OF TENDER JOINTS: 0
TOTAL NUMBER OF TENDER JOINTS: 6

## 2024-06-04 ASSESSMENT — PAIN SCALES - GENERAL: PAINLEVEL: 1

## 2024-06-04 ASSESSMENT — PATIENT HEALTH QUESTIONNAIRE - PHQ9
2. FEELING DOWN, DEPRESSED OR HOPELESS: NOT AT ALL
SUM OF ALL RESPONSES TO PHQ9 QUESTIONS 1 AND 2: 0
1. LITTLE INTEREST OR PLEASURE IN DOING THINGS: NOT AT ALL

## 2024-06-04 ASSESSMENT — ENCOUNTER SYMPTOMS
DEPRESSION: 0
LOSS OF SENSATION IN FEET: 0
OCCASIONAL FEELINGS OF UNSTEADINESS: 0

## 2024-06-04 NOTE — PROGRESS NOTES
"Chief Complaint:    This 74 y.o. female presents with the chief complaint of osteoporosis (OP) and fibromyalgia (FMS).    Subjective:   HPI   Problem:  1: OP        The patient reports no distal long bone pain of the extremities. She further denies any incident fracture.        The patient continues on her daily CALCIUM + VITAMIN D supplements as recommended. More specifically, the patient is now using a VITAMIN D 5000 unit supplement daily.        The patient remains also avers that she is not having any adverse side effects to ALENDRONATE 70 mg weekly.         Overall, the patient is satisfied with her treatment and intends to continue this plan going forward.    2.  Left hand pain          The patient reports that she is no longer having any left hand pain. What she does report is tingling paresthesias of all 5 digit fingertips.           The patient also reports that she has neck pain that she attributes to \"arthritis\". However, she remarks that no one has ever informed her that she has definite arthritis, even though she is now going to  where she is getting cervical traction and acupuncture and massage.     3.   FMS        The patient has stopped GABAPENTIN \"because this drug slows down my hair growth\". She stopped the agent ~6 wk ago. So, she is taking a biotin-containing vitamin to enhance her hair growth.         The patient has since experienced more pain, for which she uses APAP 500-1000 mg prn.         The patient is not sure whether or not she will restart the GABAPENTIN; apparently, it depends on whether she has a FMS-associated flare.     Review of Systems   All other systems reviewed and are negative.    Objective:   /80   Pulse 82   Ht 1.562 m (5' 1.5\")   Wt 60.4 kg (133 lb 3.2 oz)   SpO2 98%   BMI 24.76 kg/m²      Physical Exam  Vitals and nursing note reviewed.   Constitutional:       Appearance: Normal appearance.   HENT:      Head: Normocephalic.   Eyes:      Extraocular Movements: " Extraocular movements intact.      Conjunctiva/sclera: Conjunctivae normal.      Pupils: Pupils are equal, round, and reactive to light.   Neck:      Vascular: No carotid bruit.   Cardiovascular:      Rate and Rhythm: Normal rate.      Pulses: Normal pulses.      Heart sounds: No murmur heard.     No gallop.   Pulmonary:      Effort: Pulmonary effort is normal.      Breath sounds: Normal breath sounds.   Musculoskeletal:      Cervical back: Normal range of motion. Tenderness present. No rigidity.   Lymphadenopathy:      Cervical: No cervical adenopathy.   Skin:     General: Skin is warm.      Findings: No bruising or rash.   Neurological:      Mental Status: She is alert.        >12 fibromyalgic tender points, enthesopathic pain. No  active synovitis.     Assessment:   Fibromyalgia syndrome -M79.7  Tingling paresthesias of left hand - R20.2  Osteoporosis - M81.0  Cervical spondylosis with radiculpathy -M47.22    Plan:    Return to office in three months  Patient provided counseling about her cervicalgia (already obtaining PT), about fibromyalgia symptoms (provided with educational materials to read), and about osteoporosis. All queries addressed. Patient now agreeable to restarting GABAPENTIN, and is asking for refill.About 40 min required to address her several problems, concerns and counseling.    Dionisio Curtis MD

## 2024-06-04 NOTE — PATIENT INSTRUCTIONS
-Medrol Dosepak ordered  -Try Tumeric 1000 mg per day +curcumin daily    -Either stop gabapentin or go up on gabapentin to 100 mg 3 times per day, and slowly over time by 100 mg at a time every few days until you are at 300 mg 3 times per day, eventually 600 mg 3 times per day  -Consider other medications in the future  -Try to get me your recent EMG report  -Consider injections: Trigger point injections (handout provided), ultrasound-guided subacromial injection on the right, pes anserinus bursitis injection, referral for neck injections  -Exercises provided for the knee and shoulder  -Follow-up in 6 to 8 weeks

## 2024-06-04 NOTE — TELEPHONE ENCOUNTER
Pt could not wait for discharge papers or make an appt as her ride (linda) was here, I did LVM to call the office to make 6-8 week appt and I am mailing her AVS and etc.

## 2024-06-04 NOTE — LETTER
"June 4, 2024     Kaitlynn Reyes MD PhD  [address] [facility] [address]    Patient: Christiana Ferguson   YOB: 1950   Date of Visit: 6/4/2024       Dear Dr. Kaitlynn Reyes MD PhD:    Thank you for referring Christiana Ferguson to me for evaluation. Below are my notes for this consultation.  If you have questions, please do not hesitate to call me. I look forward to following your patient along with you.       Sincerely,     Lola Sinha MD      CC: No Recipients  ______________________________________________________________________________________    Chief complaint: Fibromyalgia, neck pain    Dear Kaitlynn Fernandez MD PhD    I had the pleasure of seeing your patient, Christiana Ferguson, in clinic today. As you know, She is a pleasant 74 y.o. right handed woman with past medical history significant for HTN, osteoporosis, primary hyperparathyroidism, cataract, who presents for evaluation of fibromyalgia and neck pain.     TIMELINE OF COMPLAINT(S):    She has pain in many different areas, remembering that she \"always hurts\" for many decades, but got especially severe in the past 15 years, with no inciting or traumatic event.  There was some whiplash injuries, falls over ice over the years, but nothing severe.      At this point, her right shoulder and neck, right arm is the most bothersome.      She also has right anterior medial knee pain that began a couple months ago.    Pain management note from Dr. Rodriguez 4/8/2024 personally reviewed today.  He recommended conservative management including physical therapy, acupuncture, traction and she has been doing this for the past couple of months.    PCP note 3/25/2024 personally reviewed today.    CTS R and trigger finger release surgeries last year helped.  She had an EMG done at an outside facility, she will try to get me the report.    Overall, she has chronic pain, but manages it generally, except for when she has a flareup.  This past April she had a big " "flareup after she took a [employer] trip to [place]      Pain:  LOCATION-right arm, shoulder blade posterior and lateral shoulder, R neck, right knee atnerior medial aspect  RADIATION-  CONSTANT or INTERMITTENT- Constant  SEVERITY/[id number]/10  QUALITY- aching, burning, dull, pressure, sharp, and throbbing, pressure  WEAKNESS-left hip  NUMBNESS/TINGLING-bilateral hands  ASSOCIATED WITH- Crackling and Snapping  EXACERBATED BY-overdoing activities, not sleeping  BETTER WITH-heat, massage, traction manipulation, medications, rest  TRIED- Tylenol helps sometimes      Anti-Inflammatories: Ibuprofen helps, previously turmeric but stopped taking it for some reason it did help, previously Celebrex not sure if it helped      Muscle relaxants:      Anti-depressants:      Neuroleptics: Gabapentin 100 mg BID      LDN:    PHYSICAL THERAPY: Yes, March and April 2024, general strength and stretching, helps a little. Doing HEP.   CHIROPRACTIC MANIPULATION: Yes  TENS unit: No  ACUPUNCTURE TREATMENTS: Yes, maybe helped  DEEP TISSUE MASSAGE THERAPY: No  OSTEOPATHIC MANIPULATION THERAPY: No    EMG/NCS: Yes Dr. Daisy DEXTER, in the past  year    INJECTIONS: one injection \"near shoulder blade\", reports of previous LUIS's \"helping\" but pt didn't remember it helping.     IMAGING:    === 02/29/24 ===    XR KNEE 3 VIEWS RIGHT    - Impression -  No evidence for acute osseous abnormality. No significant interval  change, although there may be slight narrowing of the lateral  compartment.    Left elbow x-ray 11/23/2022: Mild degenerative changes    Left shoulder x-ray 11/23/2022: Mild degenerative changes at the acromioclavicular joint    Right shoulder x-ray 10/10/2022: Mild glenohumeral osteoarthritis    Right hand x-ray 10/10/2022: Osteoporosis, no other pathologies    Bilateral hand x-rays 8/4/2022: No acute osseous abnormalities.  Previously seen nondisplaced fracture through the right thumb not well-seen.    MRI cervical spine " 4/12/2021:    IMPRESSION:     1. Diffuse cervical disc/joint degeneration.     2.  Significant bilateral C5-6 and left C4-5 and right C6-7 bony foramina   narrowing.     3. Patent remaining cervical canal and foramen.     X-ray thoracic and lumbar spine 9/5/2019:  Vertebral body heights:  Mild superior endplate compression deformity of L2. Remainder of vertebral  body heights maintained.     Disc spaces:  Disc space narrowing, endplate irregularity and osteophyte formation most  pronounced L4-5 from chronic degenerative change.    Neck xray pending  Lab Results   Component Value Date    HGBA1C 6.1 03/25/2024       FUNCTIONAL HISTORY: The patient is independent in all ADLs, mobility, and driving. The patient uses a cane for long distances jkl;'    SH:  Lives in: [place]  Lives with: Alone  Occupation: Retired teacher  Tobacco: 1 pack every 2 weeks  Alcohol: On holidays, few times per year  Drugs: No    ROS: The patient denies any bowel or bladder incontinence/accidents, night sweats, fevers, chills, recent significant weight loss. A 14 point review of systems was reviewed with the patient and is as above and otherwise negative.  ROS questionnaire positive for numbness/tingling, weakness, difficulty walking, muscle pain/tenderness, spasms, joint pain, lower back pain, anxiety, sleep disturbances, limited range of motion      Physical Exam     PHYSICAL EXAM    GEN - Alert, well-developed, well-nourished, no acute distress  PSYCH - Cooperative, appropriate mood and affect  HEENT - NC/AT  RESP - Non-labored respirations, equal expansion  CV - warm and well-perfused, No cyanosis or edema in extremities.  ABD- soft, ND  SKIN - No rash.    NECK/EXTR- Cervical ROM is full with forward flexion, extension, rotation, and side bending with mild provocation of the endpoints with side rotation and bending to the right. Spurlings and Lhermitte's negative. No tenderness of the cervical spinous processes.  There was tenderness of  the cervical paraspinal muscles and trapezei musculature as well as the scapular musculature  Right worse than left.  Scapulae are symmetrical without evidence of medial or lateral winging.    Shoulder diminished at the endpoints of forward flexion, abduction, and internal rotation with provocation of pain.  Positive Neer test, positive Richardson test, positive empty can test.  Rotator cuff muscles were weak due to pain.      Knee: No warmth, effusion, or erythema.  No popliteal fullness.  No anterior, posterior, medial or lateral instability.  There was significant pes anserine tenderness on the right.  There is no pain with hyperflexion of the knee.  There is no pain with varus or valgus stressing of the knee during flexion and extension.  There is no crepitus.  There is no medial or lateral joint line tenderness.    NEURO -   UE strength 5/5 -  including shoulder abduction (4/5 on the right), biceps, triceps, wrist extensors, finger flexors, interossei, and    LE strength 5/5 -  including hip flexors (5 -/5 bilaterally), knee flexors, knee extensors, ankle dorsiflexors, ankle plantar flexors, and EHL   Sensation - intact to light touch in bilateral lower and upper extremities.   Reflexes - 2+ biceps, brachioradialis, triceps, 1+ patellar and Achilles reflexes bilaterally No Clonus, No Babinski, Mccain's negative bilaterally  GAIT - Normal base, normal stride length, non-antalgic. Able to toe walk and heel walk without difficulty.  Right shoulder droop compared to left    IMPRESSION:    This is a pleasant 74 y.o. right handed woman with past medical history significant for HTN, osteoporosis, primary hyperparathyroidism, cataract, who presents for evaluation of fibromyalgia and neck pain.  Physical exam is reassuring for lack of neurologic compromise.  Symptoms of physical exam findings in this complex patient are likely multifactorial in nature due to combination of right rotator cuff pathology, cervical  spondylosis and reactive myofascial pain/tension, pes anserinus bursitis on the right, exacerbated by underlying deconditioning and fibromyalgia.        -Patient Education: Extensive time was spent educating the patient on relevant anatomy, clinical findings and imaging, as well as discussing the potential diagnoses as discussed above.   -Medrol Dosepak ordered. The medication mechanism, side effects as well as the risks, benefits, and alternatives were discussed. Goals of therapy discussed. The patient expressed understanding of this.    -Try Tumeric 1000 mg per day +curcumin daily    -Either stop gabapentin or go up on gabapentin to 100 mg 3 times per day, and slowly over time by 100 mg at a time every few days until you are at 300 mg 3 times per day, eventually 600 mg 3 times per day  -Consider other medications in the future  -Try to get me your recent EMG report  -Consider injections: Trigger point injections (handout provided), ultrasound-guided subacromial injection on the right, pes anserinus bursitis injection, referral for neck injections  -Exercises provided for the knee and shoulder  -Follow-up in 6 to 8 weeks        The patient expressed understanding and agreement with the assessment and plan. Patient encouraged to contact us should they have any questions, concerns, or any changes in symptoms.     Thank you for allowing me to participate in the care of your patient.      ** Dictated with voice recognition software, please forgive any errors in grammar and/or spelling **

## 2024-06-06 ENCOUNTER — PHARMACY VISIT (OUTPATIENT)
Dept: PHARMACY | Facility: CLINIC | Age: 74
End: 2024-06-06
Payer: MEDICAID

## 2024-06-07 ENCOUNTER — PHARMACY VISIT (OUTPATIENT)
Dept: PHARMACY | Facility: CLINIC | Age: 74
End: 2024-06-07
Payer: MEDICAID

## 2024-06-07 PROCEDURE — RXMED WILLOW AMBULATORY MEDICATION CHARGE

## 2024-06-07 RX ORDER — LOSARTAN POTASSIUM 100 MG/1
TABLET ORAL
Qty: 90 TABLET | Refills: 1 | Status: CANCELLED | OUTPATIENT
Start: 2024-06-07

## 2024-06-14 ENCOUNTER — PHARMACY VISIT (OUTPATIENT)
Dept: PHARMACY | Facility: CLINIC | Age: 74
End: 2024-06-14
Payer: MEDICAID

## 2024-06-14 ENCOUNTER — OFFICE VISIT (OUTPATIENT)
Dept: PRIMARY CARE | Facility: CLINIC | Age: 74
End: 2024-06-14
Payer: COMMERCIAL

## 2024-06-14 ENCOUNTER — HOSPITAL ENCOUNTER (OUTPATIENT)
Dept: RADIOLOGY | Facility: HOSPITAL | Age: 74
Discharge: HOME | End: 2024-06-14
Payer: COMMERCIAL

## 2024-06-14 VITALS
DIASTOLIC BLOOD PRESSURE: 76 MMHG | HEIGHT: 61 IN | BODY MASS INDEX: 25.11 KG/M2 | HEART RATE: 90 BPM | SYSTOLIC BLOOD PRESSURE: 118 MMHG | WEIGHT: 133 LBS | OXYGEN SATURATION: 95 % | TEMPERATURE: 97 F

## 2024-06-14 DIAGNOSIS — M54.12 CERVICAL RADICULOPATHY: ICD-10-CM

## 2024-06-14 DIAGNOSIS — D47.2 MGUS (MONOCLONAL GAMMOPATHY OF UNKNOWN SIGNIFICANCE): ICD-10-CM

## 2024-06-14 DIAGNOSIS — I10 PRIMARY HYPERTENSION: Primary | ICD-10-CM

## 2024-06-14 DIAGNOSIS — R73.03 PREDIABETES: ICD-10-CM

## 2024-06-14 DIAGNOSIS — E78.5 DYSLIPIDEMIA: ICD-10-CM

## 2024-06-14 DIAGNOSIS — F41.9 ANXIETY: ICD-10-CM

## 2024-06-14 DIAGNOSIS — M81.0 OSTEOPOROSIS, UNSPECIFIED OSTEOPOROSIS TYPE, UNSPECIFIED PATHOLOGICAL FRACTURE PRESENCE: ICD-10-CM

## 2024-06-14 DIAGNOSIS — M79.7 FIBROMYALGIA: ICD-10-CM

## 2024-06-14 PROCEDURE — 3078F DIAST BP <80 MM HG: CPT | Performed by: INTERNAL MEDICINE

## 2024-06-14 PROCEDURE — RXMED WILLOW AMBULATORY MEDICATION CHARGE

## 2024-06-14 PROCEDURE — 99214 OFFICE O/P EST MOD 30 MIN: CPT | Performed by: INTERNAL MEDICINE

## 2024-06-14 PROCEDURE — 1159F MED LIST DOCD IN RCRD: CPT | Performed by: INTERNAL MEDICINE

## 2024-06-14 PROCEDURE — 72050 X-RAY EXAM NECK SPINE 4/5VWS: CPT

## 2024-06-14 PROCEDURE — 3074F SYST BP LT 130 MM HG: CPT | Performed by: INTERNAL MEDICINE

## 2024-06-14 PROCEDURE — 1157F ADVNC CARE PLAN IN RCRD: CPT | Performed by: INTERNAL MEDICINE

## 2024-06-14 PROCEDURE — 1125F AMNT PAIN NOTED PAIN PRSNT: CPT | Performed by: INTERNAL MEDICINE

## 2024-06-14 PROCEDURE — 1124F ACP DISCUSS-NO DSCNMKR DOCD: CPT | Performed by: INTERNAL MEDICINE

## 2024-06-14 RX ORDER — AMLODIPINE BESYLATE 5 MG/1
5 TABLET ORAL DAILY
Qty: 90 TABLET | Refills: 1 | Status: SHIPPED | OUTPATIENT
Start: 2024-06-14 | End: 2024-12-11

## 2024-06-14 RX ORDER — LOSARTAN POTASSIUM 100 MG/1
TABLET ORAL
Qty: 90 TABLET | Refills: 1 | Status: SHIPPED | OUTPATIENT
Start: 2024-06-14

## 2024-06-14 ASSESSMENT — ENCOUNTER SYMPTOMS
OCCASIONAL FEELINGS OF UNSTEADINESS: 0
DEPRESSION: 0
LOSS OF SENSATION IN FEET: 0

## 2024-06-14 ASSESSMENT — PATIENT HEALTH QUESTIONNAIRE - PHQ9
2. FEELING DOWN, DEPRESSED OR HOPELESS: NOT AT ALL
1. LITTLE INTEREST OR PLEASURE IN DOING THINGS: NOT AT ALL
SUM OF ALL RESPONSES TO PHQ9 QUESTIONS 1 AND 2: 0

## 2024-06-14 ASSESSMENT — PAIN SCALES - GENERAL: PAINLEVEL: 9

## 2024-06-14 ASSESSMENT — COLUMBIA-SUICIDE SEVERITY RATING SCALE - C-SSRS
2. HAVE YOU ACTUALLY HAD ANY THOUGHTS OF KILLING YOURSELF?: NO
6. HAVE YOU EVER DONE ANYTHING, STARTED TO DO ANYTHING, OR PREPARED TO DO ANYTHING TO END YOUR LIFE?: NO
1. IN THE PAST MONTH, HAVE YOU WISHED YOU WERE DEAD OR WISHED YOU COULD GO TO SLEEP AND NOT WAKE UP?: NO

## 2024-06-14 NOTE — PROGRESS NOTES
"Subjective   Patient ID: Christiana Ferguson is a 74 y.o. female who presents for Establish Care (Pt has questions about BP medications).    HPI     Hypertension, EDWAR, family history of premature CAD  MGUS, carpal tunnel, fibromyalgia, osteoarthritis, anxiety disorder       H/O Hypertension- seen Dr Martinez, in 2023 for elevated blood pressure reading  Also seen Dr Boland at at [facility] in February 2024  Stated she was started on hydrochlorothiazide, which she didn't take due to dizziness    H/O Osteoporosis- seeing Jeovany    H/O Fibromyalgia, chronic pain, seeing pain manageemnt Dr Veliz, getting acuounture  Had right shoulder taped    H/O Sleep disorder, anxiety- [facility] psychiatriust at [facility]    MGUS- saw hematologist in 2021, she had testing after 23 and me test showed she has high M protein. Was advised by hematology that she doesn;t ne3ed further follow up             Review of Systems   Constitutional:  Negative for chills, fatigue and unexpected weight change.   HENT:  Negative for postnasal drip, sinus pressure and trouble swallowing.    Respiratory:  Negative for cough, shortness of breath and wheezing.    Cardiovascular:  Negative for chest pain, palpitations and leg swelling.   Gastrointestinal:  Negative for abdominal pain, blood in stool, nausea and vomiting.   Endocrine: Negative for polydipsia, polyphagia and polyuria.   Genitourinary:  Negative for dysuria and frequency.   Musculoskeletal:  Positive for arthralgias and back pain. Negative for myalgias.   Skin:  Negative for rash.   Neurological:  Negative for tremors, seizures and numbness.   Psychiatric/Behavioral:  Negative for behavioral problems.        Objective   /76 (BP Location: Left arm, Patient Position: Sitting, BP Cuff Size: Small adult)   Pulse 90   Temp 36.1 °C (97 °F) (Temporal)   Ht 1.549 m (5' 1\")   Wt 60.3 kg (133 lb)   SpO2 95%   BMI 25.13 kg/m²     Physical Exam  Constitutional:       General: She is not in " acute distress.     Appearance: Normal appearance.   HENT:      Head: Normocephalic and atraumatic.   Eyes:      Extraocular Movements: Extraocular movements intact.      Pupils: Pupils are equal, round, and reactive to light.   Cardiovascular:      Rate and Rhythm: Normal rate and regular rhythm.      Heart sounds: Normal heart sounds. No murmur heard.     No friction rub.   Pulmonary:      Effort: Pulmonary effort is normal.      Breath sounds: Normal breath sounds. No wheezing or rales.   Abdominal:      General: Bowel sounds are normal.      Palpations: Abdomen is soft.      Tenderness: There is no abdominal tenderness. There is no guarding.   Musculoskeletal:      Cervical back: Normal range of motion and neck supple.      Right lower leg: No edema.      Left lower leg: No edema.      Comments: Reduced range of motion of right shoulder   Lymphadenopathy:      Cervical: No cervical adenopathy.   Skin:     General: Skin is warm and dry.      Findings: No rash.   Neurological:      General: No focal deficit present.      Mental Status: She is alert and oriented to person, place, and time.      Cranial Nerves: No cranial nerve deficit.      Gait: Gait normal.   Psychiatric:         Mood and Affect: Mood normal.         Assessment/Plan        Christiana was seen today for establish care.  Diagnoses and all orders for this visit:  Primary hypertension (Primary)  -     losartan (Cozaar) 100 mg tablet; Take 1 tablet by mouth once daily  -     amLODIPine (Norvasc) 5 mg tablet; Take 1 tablet (5 mg) by mouth once daily.  -     CBC and Auto Differential; Future  -     Comprehensive Metabolic Panel; Future  Osteoporosis, unspecified osteoporosis type, unspecified pathological fracture presence  Fibromyalgia  Prediabetes  -     Hemoglobin A1C; Future  MGUS (monoclonal gammopathy of unknown significance)  -     Serum Protein Electrophoresis; Future  Dyslipidemia  -     Lipid Panel; Future  Anxiety  -     TSH with reflex to Free  T4 if abnormal; Future    Past Medical History:   Diagnosis Date    Age-related nuclear cataract of both eyes     Anxiety     Dry eye syndrome of bilateral lacrimal glands     Neck pain     Unspecified disorder of refraction      History reviewed. No pertinent surgical history.  Reviewed prior medical records  Advised to continue with losartan and amlodipine  No need to take hydrochlorothiazide for now  Follow-up with home blood pressure readings goal reading less than 140/80        Current Outpatient Medications   Medication Instructions    acetaminophen (Tylenol) 325 mg tablet 2 tablets, oral, Every 6 hours PRN    alendronate (FOSAMAX) 70 mg, oral, Every 7 days, Take in the morning with a full glass of water, on an empty stomach, and do not take anything else by mouth or lie down for the next 30 min.    amLODIPine (NORVASC) 5 mg, oral, Daily    busPIRone (Buspar) 15 mg tablet 1 tablet, oral, 3 times daily    calcium carbonate EX (Tums E-X) 300 mg (750 mg) chewable tablet 1 tablet, oral, Daily    cholecalciferol (Vitamin D-3) 5,000 Units tablet as directed Orally    clonazePAM (KlonoPIN) 0.5 mg tablet 1 tablet, oral, 2 times daily PRN    diclofenac sodium (Voltaren) 1 % gel     divalproex (Depakote ER) 250 mg 24 hr tablet 1-2 tablets, oral, Nightly PRN    gabapentin (NEURONTIN) 100 mg, oral, 3 times daily    ibuprofen 800 mg, oral, Every 6 hours PRN    losartan (Cozaar) 100 mg tablet Take 1 tablet by mouth once daily    magnesium oxide-Mg AA chelate (Magnesium, oxide/AA chelate,) 300 mg capsule 1 capsule, oral, Daily, With a meal    risedronate (Actonel) 150 mg tablet PLEASE SEE ATTACHED FOR DETAILED DIRECTIONS<BR>    TURMERIC ORAL 1 capsule, oral, 2 times daily    zolpidem (AMBIEN) 20 mg, oral, Nightly PRN

## 2024-06-17 ASSESSMENT — ENCOUNTER SYMPTOMS
NUMBNESS: 0
VOMITING: 0
CHILLS: 0
WHEEZING: 0
ABDOMINAL PAIN: 0
POLYPHAGIA: 0
SHORTNESS OF BREATH: 0
PALPITATIONS: 0
UNEXPECTED WEIGHT CHANGE: 0
POLYDIPSIA: 0
BACK PAIN: 1
NAUSEA: 0
SEIZURES: 0
SINUS PRESSURE: 0
FREQUENCY: 0
ARTHRALGIAS: 1
TREMORS: 0
BLOOD IN STOOL: 0
TROUBLE SWALLOWING: 0
FATIGUE: 0
MYALGIAS: 0
DYSURIA: 0
COUGH: 0

## 2024-07-11 ENCOUNTER — DOCUMENTATION (OUTPATIENT)
Dept: PHYSICAL THERAPY | Facility: CLINIC | Age: 74
End: 2024-07-11
Payer: COMMERCIAL

## 2024-07-11 NOTE — PROGRESS NOTES
Physical Therapy    Discharge Summary    Name: Christiana Ferguson  MRN: [id number]  : 1950  Date: 24    Discharge Summary: PT    Discharge Information: Date of evaluation 2024    Therapy Summary: Pt with slow progress during therapy and goals remain partially met. She has improvements in AROM and strength, however remains with chronic pain. She stopped attending therapy and had 4 visits remaining.    Discharge Status: Cont with HEP on own     Rehab Discharge Reason: Failed to schedule and/or keep follow-up appointment(s) and Progress plateaued; further improvement possible

## 2024-07-15 ENCOUNTER — APPOINTMENT (OUTPATIENT)
Dept: OTOLARYNGOLOGY | Facility: CLINIC | Age: 74
End: 2024-07-15
Payer: COMMERCIAL

## 2024-07-15 VITALS — HEIGHT: 62 IN | WEIGHT: 135 LBS | BODY MASS INDEX: 24.84 KG/M2

## 2024-07-15 DIAGNOSIS — G47.33 OBSTRUCTIVE SLEEP APNEA: ICD-10-CM

## 2024-07-15 DIAGNOSIS — R42 DIZZINESS: ICD-10-CM

## 2024-07-15 DIAGNOSIS — R42 VERTIGO: Primary | ICD-10-CM

## 2024-07-15 PROCEDURE — 99203 OFFICE O/P NEW LOW 30 MIN: CPT | Performed by: OTOLARYNGOLOGY

## 2024-07-15 PROCEDURE — 1159F MED LIST DOCD IN RCRD: CPT | Performed by: OTOLARYNGOLOGY

## 2024-07-15 PROCEDURE — 1157F ADVNC CARE PLAN IN RCRD: CPT | Performed by: OTOLARYNGOLOGY

## 2024-07-15 PROCEDURE — 4004F PT TOBACCO SCREEN RCVD TLK: CPT | Performed by: OTOLARYNGOLOGY

## 2024-07-15 NOTE — PROGRESS NOTES
Chief Complaint   Patient presents with    New Patient Visit     NP- DIZZINESS, NO AUDIO DONE, REFERRED BY PCP HAD IMAGING DONE, THINKS FROM BP MEDICATION     HPI:  Christiana Ferguson is a 74 y.o. female 3 months ago she had acute onset of vertigo which was short-lived.  No problems since that time.  She thinks it was related to her blood pressure medication she is no longer taking.  Denies hearing loss, tinnitus, pressure, pain.  She has obstructive sleep apnea but is unable to tolerate CPAP and does not wish any further therapy.    PMH:  Past Medical History:   Diagnosis Date    Age-related nuclear cataract of both eyes     Anxiety     Dry eye syndrome of bilateral lacrimal glands     Neck pain     Unspecified disorder of refraction      History reviewed. No pertinent surgical history.      Medications:     Current Outpatient Medications:     acetaminophen (Tylenol) 325 mg tablet, Take 2 tablets (650 mg) by mouth every 6 hours if needed for mild pain (1 - 3) or fever (temp greater than 38.0 C)., Disp: , Rfl:     alendronate (Fosamax) 70 mg tablet, Take 1 tablet (70 mg) by mouth every 7 days. Take in the morning with a full glass of water, on an empty stomach, and do not take anything else by mouth or lie down for the next 30 min., Disp: 12 tablet, Rfl: 1    amLODIPine (Norvasc) 5 mg tablet, Take 1 tablet (5 mg) by mouth once daily., Disp: 90 tablet, Rfl: 1    busPIRone (Buspar) 15 mg tablet, Take 1 tablet (15 mg) by mouth 3 times a day., Disp: , Rfl:     calcium carbonate EX (Tums E-X) 300 mg (750 mg) chewable tablet, Chew 1 tablet (750 mg) once daily., Disp: , Rfl:     cholecalciferol (Vitamin D-3) 5,000 Units tablet, as directed Orally, Disp: , Rfl:     clonazePAM (KlonoPIN) 0.5 mg tablet, Take 1 tablet (0.5 mg) by mouth 2 times a day as needed for anxiety., Disp: , Rfl:     diclofenac sodium (Voltaren) 1 % gel, , Disp: , Rfl:     divalproex (Depakote ER) 250 mg 24 hr tablet, Take 1-2 tablets (250-500 mg) by mouth  "as needed at bedtime (ANXIETY / INSOMNIA)., Disp: , Rfl:     gabapentin (Neurontin) 100 mg capsule, Take 1 capsule (100 mg) by mouth 3 times a day., Disp: 270 capsule, Rfl: 1    ibuprofen 800 mg tablet, Take 1 tablet (800 mg) by mouth every 6 hours if needed., Disp: , Rfl:     losartan (Cozaar) 100 mg tablet, Take 1 tablet by mouth once daily, Disp: 90 tablet, Rfl: 1    magnesium oxide-Mg AA chelate (Magnesium, oxide/AA chelate,) 300 mg capsule, Take 1 capsule (300 mg) by mouth once daily. With a meal, Disp: , Rfl:     risedronate (Actonel) 150 mg tablet, PLEASE SEE ATTACHED FOR DETAILED DIRECTIONS, Disp: , Rfl:     TURMERIC ORAL, Take 1 capsule by mouth 2 times a day., Disp: , Rfl:     zolpidem (Ambien) 10 mg tablet, Take 2 tablets (20 mg) by mouth as needed at bedtime., Disp: , Rfl:      Allergies:  Allergies   Allergen Reactions    Codeine GI Upset, Hallucinations and Unknown     Hallucinate.   Pt states she is not allergic.    Nickel Rash        ROS:  Review of systems normal unless stated otherwise in the HPI and/or PMH.    Physical Exam:  Height 1.562 m (5' 1.5\"), weight 61.2 kg (135 lb). Body mass index is 25.09 kg/m².     GENERAL APPEARANCE: Well developed and well nourished.  Alert and oriented in no acute distress.  Normal vocal quality.      HEAD/FACE: No erythema or edema or facial tenderness.  Normal facial nerve function bilaterally.    EAR:       EXTERNAL: Normal pinnas and external auditory canals without lesion or obstructing wax.       MIDDLE EAR: Tympanic membranes intact and mobile with normal landmarks.  Middle ear space appears well aerated.       TUBE STATUS: N/A       MASTOID CAVITY: N/A       HEARING: Gross hearing assessment is within normal limits.      NOSE:       VISUALIZED USING: Anterior rhinoscopy with headlight and nasal speculum.       DORSUM: Midline, nontraumatic appearance.       MUCOSA: Normal-appearing.       SECRETIONS: Normal.       SEPTUM: Midline and nonobstructing.       " INFERIOR TURBINATES: Normal.       MIDDLE TURBINATES/MEATUS: N/A       BLEEDING: N/A         ORAL CAVITY/PHARYNX:       TEETH: Adequate dentition.       TONGUE: No mass or lesion.  Normal mobility.       FLOOR OF MOUTH: No mass or lesion.       PALATE: Normal hard palate, soft palate, and uvula.       OROPHARYNX: Normal without mass or lesion.       BUCCAL MUCOSA/GBS: Normal without mass or lesion.       LIPS: Normal.    LARYNX/HYPOPHARYNX/NASOPHARYNX: N/A    NECK: No palpable masses or abnormal adenopathy.  Trachea is midline.    THYROID: No thyromegaly or palpable nodule.    SALIVARY GLANDS: Normal bilateral parotid and submandibular glands by inspection and palpation.    TMJ's: Normal.    NEURO: Cranial nerve exam grossly normal bilaterally.       Assessment/Plan   Christiana was seen today for new patient visit.  Diagnoses and all orders for this visit:  Vertigo (Primary)  Dizziness  -     Referral to ENT  Obstructive sleep apnea     Vertigo has resolved and was short-lived 3 months ago.  No further workup necessary at this time.  Follow-up with any changes.  Follow up if symptoms worsen or fail to improve.     Juancho Landon MD

## 2024-07-22 NOTE — PROGRESS NOTES
"  Addendum 7/23/2024:  EMG report received from [facility] in [facility], performed by Dr. Crain, 1/9/2024  Impression: Mild chronic C6 and C7 radiculopathy bilaterally worse on the left.  Moderate bilateral carpal tunnel syndrome at the wrist worse on the right  Report personally reviewed and interpreted today, will be scanned into our system    Addendum 9/27/2024  EMG report from 7/17/2017 received and reviewed, will be scanned into our system, also performed by Dr. Crain  Moderate right carpal tunnel syndrome    Chief complaint: Fibromyalgia, neck pain follow-up    This is a pleasant 74 y.o. right handed woman with past medical history significant for HTN, osteoporosis, primary hyperparathyroidism, cataract, who presents for follow-up of fibromyalgia and neck pain.     She was last seen here on 6/4/2024, at which point I advised her to start a Medrol Dosepak and try turmeric. This has helped. She is not taking tylenol as much anymore.     I advised her to either go up on gabapentin or wean off of it. She is now taking 400 mg BID. Helps. She would like to stay at this dose for now.    I gave her some exercises for the knee and shoulder. She is doing mostly walking. And this has helped. But her hip still feels week. I discussed the importance of strength training again.    I asked her to get me her recent EMG report.    She rates her pain as 5/10    Otherwise, there have been no changes to her medications or past medical history since last visit    _________________________________  7/23/2024: continue meds, hip exercises provided, consider other meds, injections in the future.    __________________________________  As a reminder:  TIMELINE OF COMPLAINT(S):    She has pain in many different areas, remembering that she \"always hurts\" for many decades, but got especially severe in the past 15 years, with no inciting or traumatic event.  There was some whiplash injuries, falls over ice over the years, but nothing " "severe.      At this point, her right shoulder and neck, right arm is the most bothersome.      She also has right anterior medial knee pain that began a couple months ago.    Pain management note from Dr. Rodriguez 4/8/2024 personally reviewed today.  He recommended conservative management including physical therapy, acupuncture, traction and she has been doing this for the past couple of months.    PCP note 3/25/2024 personally reviewed today.    CTS R and trigger finger release surgeries last year helped.  She had an EMG done at an outside facility, she will try to get me the report.    Overall, she has chronic pain, but manages it generally, except for when she has a flareup.  This past April she had a big flareup after she took a [employer] trip to [place]      Pain:  LOCATION-right arm, shoulder blade posterior and lateral shoulder, R neck, right knee atnerior medial aspect  RADIATION-  CONSTANT or INTERMITTENT- Constant  SEVERITY/WIDRULGS-0-7/10  QUALITY- aching, burning, dull, pressure, sharp, and throbbing, pressure  WEAKNESS-left hip  NUMBNESS/TINGLING-bilateral hands  ASSOCIATED WITH- Crackling and Snapping  EXACERBATED BY-overdoing activities, not sleeping  BETTER WITH-heat, massage, traction manipulation, medications, rest  TRIED- Tylenol helps sometimes      Anti-Inflammatories: Ibuprofen helps, previously turmeric but stopped taking it for some reason it did help, previously Celebrex not sure if it helped      Muscle relaxants:      Anti-depressants:      Neuroleptics: Gabapentin 100 mg BID      LDN:    PHYSICAL THERAPY: Yes, March and April 2024, general strength and stretching, helps a little. Doing HEP.   CHIROPRACTIC MANIPULATION: Yes  TENS unit: No  ACUPUNCTURE TREATMENTS: Yes, maybe helped  DEEP TISSUE MASSAGE THERAPY: No  OSTEOPATHIC MANIPULATION THERAPY: No    EMG/NCS: Yes Dr. Daisy DEXTER, in the past  year    INJECTIONS: one injection \"near shoulder blade\", reports of previous LUIS's " "\"helping\" but pt didn't remember it helping.     IMAGING:    === 02/29/24 ===    XR KNEE 3 VIEWS RIGHT    - Impression -  No evidence for acute osseous abnormality. No significant interval  change, although there may be slight narrowing of the lateral  compartment.    Left elbow x-ray 11/23/2022: Mild degenerative changes    Left shoulder x-ray 11/23/2022: Mild degenerative changes at the acromioclavicular joint    Right shoulder x-ray 10/10/2022: Mild glenohumeral osteoarthritis    Right hand x-ray 10/10/2022: Osteoporosis, no other pathologies    Bilateral hand x-rays 8/4/2022: No acute osseous abnormalities.  Previously seen nondisplaced fracture through the right thumb not well-seen.    MRI cervical spine 4/12/2021:    IMPRESSION:     1. Diffuse cervical disc/joint degeneration.     2.  Significant bilateral C5-6 and left C4-5 and right C6-7 bony foramina   narrowing.     3. Patent remaining cervical canal and foramen.     X-ray thoracic and lumbar spine 9/5/2019:  Vertebral body heights:  Mild superior endplate compression deformity of L2. Remainder of vertebral  body heights maintained.     Disc spaces:  Disc space narrowing, endplate irregularity and osteophyte formation most  pronounced L4-5 from chronic degenerative change.    Neck xray pending  Lab Results   Component Value Date    HGBA1C 6.1 03/25/2024       FUNCTIONAL HISTORY: The patient is independent in all ADLs, mobility, and driving. The patient uses a cane for long distances jkl;'    SH:  Lives in: [place]  Lives with: Alone  Occupation: Retired teacher  Tobacco: 1 pack every 2 weeks  Alcohol: On holidays, few times per year  Drugs: No    _______________________________  ROS: The patient denies any bowel or bladder incontinence/accidents, night sweats, fevers, chills, recent significant weight loss. A 14 point review of systems was reviewed with the patient and is as above and otherwise negative.  ROS questionnaire positive for emesis tingling, " weakness, difficulty walking, muscle pain/tightness/spasms, back pain, joint pain, anxiety, sleep disturbances      PHYSICAL EXAM    GEN - Alert, well-developed, well-nourished, no acute distress  PSYCH - Cooperative, appropriate mood and affect  HEENT - NC/AT  RESP - Non-labored respirations, equal expansion  CV - warm and well-perfused, No cyanosis or edema in extremities.  ABD- soft, ND  SKIN - No rash.    Previous:    NECK/EXTR- Cervical ROM is full with forward flexion, extension, rotation, and side bending with mild provocation of the endpoints with side rotation and bending to the right. Spurlings and Lhermitte's negative. No tenderness of the cervical spinous processes.  There was tenderness of the cervical paraspinal muscles and trapezei musculature as well as the scapular musculature  Right worse than left.  Scapulae are symmetrical without evidence of medial or lateral winging.    Shoulder diminished at the endpoints of forward flexion, abduction, and internal rotation with provocation of pain.  Positive Neer test, positive Richardson test, positive empty can test.  Rotator cuff muscles were weak due to pain.      Knee: No warmth, effusion, or erythema.  No popliteal fullness.  No anterior, posterior, medial or lateral instability.  There was significant pes anserine tenderness on the right.  There is no pain with hyperflexion of the knee.  There is no pain with varus or valgus stressing of the knee during flexion and extension.  There is no crepitus.  There is no medial or lateral joint line tenderness.    NEURO -   UE strength 5/5 -  including shoulder abduction (4/5 on the right), biceps, triceps, wrist extensors, finger flexors, interossei, and    LE strength 5/5 -  including hip flexors (5 -/5 bilaterally), knee flexors, knee extensors, ankle dorsiflexors, ankle plantar flexors, and EHL   Sensation - intact to light touch in bilateral lower and upper extremities.   Reflexes - 2+ biceps,  brachioradialis, triceps, 1+ patellar and Achilles reflexes bilaterally No Clonus, No Babinski, Mccain's negative bilaterally  GAIT - Normal base, normal stride length, non-antalgic. Able to toe walk and heel walk without difficulty.  Right shoulder droop compared to left    IMPRESSION:    This is a pleasant 74 y.o. right handed woman with past medical history significant for HTN, osteoporosis, primary hyperparathyroidism, cataract, who presents for follow-up of fibromyalgia and neck pain.  Physical exam is reassuring for lack of neurologic compromise.  Symptoms of physical exam findings in this complex patient are likely multifactorial in nature due to combination of right rotator cuff pathology, cervical spondylosis and reactive myofascial pain/tension, pes anserinus bursitis on the right, exacerbated by underlying deconditioning and fibromyalgia.         -Continue Tumeric 1000 mg per day +curcumin daily    -Continue gabapentin 400 mg twice a day, take the lowest most effective dose with the least amount of side effects, maximum is 1200 mg three times per day  -Try to get me your recent EMG report. We will contact Dr. Diaz office too.  -Consider injections: Trigger point injections (handout provided previously), ultrasound-guided subacromial injection on the right, pes anserinus bursitis injection, referral for neck injections  -Exercises provided for the hip  -Follow-up in 3 months or sooner if needed.        The patient expressed understanding and agreement with the assessment and plan. Patient encouraged to contact us should they have any questions, concerns, or any changes in symptoms.     Thank you for allowing me to participate in the care of your patient.      ** Dictated with voice recognition software, please forgive any errors in grammar and/or spelling **

## 2024-07-22 NOTE — PATIENT INSTRUCTIONS
-Continue Tumeric 1000 mg per day +curcumin daily    -Continue gabapentin 400 mg twice a day, take the lowest most effective dose with the least amount of side effects, maximum is 1200 mg three times per day  -Try to get me your recent EMG report. We will contact Dr. Diaz office too.  -Consider injections: Trigger point injections (handout provided previously), ultrasound-guided subacromial injection on the right, carpal tunnel injections , pes anserinus bursitis injection, referral for neck injections  -Exercises provided for the hip  -Follow-up in 3 months or sooner if needed.

## 2024-07-23 ENCOUNTER — APPOINTMENT (OUTPATIENT)
Dept: PHYSICAL MEDICINE AND REHAB | Facility: CLINIC | Age: 74
End: 2024-07-23
Payer: COMMERCIAL

## 2024-07-23 ENCOUNTER — TELEPHONE (OUTPATIENT)
Dept: PHYSICAL MEDICINE AND REHAB | Facility: CLINIC | Age: 74
End: 2024-07-23

## 2024-07-23 VITALS
BODY MASS INDEX: 24.29 KG/M2 | HEART RATE: 86 BPM | WEIGHT: 132 LBS | SYSTOLIC BLOOD PRESSURE: 131 MMHG | TEMPERATURE: 97.5 F | HEIGHT: 62 IN | DIASTOLIC BLOOD PRESSURE: 85 MMHG | OXYGEN SATURATION: 95 %

## 2024-07-23 DIAGNOSIS — M79.7 FIBROMYALGIA: Primary | ICD-10-CM

## 2024-07-23 PROCEDURE — 3008F BODY MASS INDEX DOCD: CPT | Performed by: PHYSICAL MEDICINE & REHABILITATION

## 2024-07-23 PROCEDURE — RXMED WILLOW AMBULATORY MEDICATION CHARGE

## 2024-07-23 PROCEDURE — 1157F ADVNC CARE PLAN IN RCRD: CPT | Performed by: PHYSICAL MEDICINE & REHABILITATION

## 2024-07-23 PROCEDURE — 3075F SYST BP GE 130 - 139MM HG: CPT | Performed by: PHYSICAL MEDICINE & REHABILITATION

## 2024-07-23 PROCEDURE — 1125F AMNT PAIN NOTED PAIN PRSNT: CPT | Performed by: PHYSICAL MEDICINE & REHABILITATION

## 2024-07-23 PROCEDURE — 3079F DIAST BP 80-89 MM HG: CPT | Performed by: PHYSICAL MEDICINE & REHABILITATION

## 2024-07-23 PROCEDURE — 99213 OFFICE O/P EST LOW 20 MIN: CPT | Performed by: PHYSICAL MEDICINE & REHABILITATION

## 2024-07-23 PROCEDURE — 1159F MED LIST DOCD IN RCRD: CPT | Performed by: PHYSICAL MEDICINE & REHABILITATION

## 2024-07-23 RX ORDER — GABAPENTIN 400 MG/1
400 CAPSULE ORAL 3 TIMES DAILY
Qty: 90 CAPSULE | Refills: 2 | Status: SHIPPED | OUTPATIENT
Start: 2024-07-23 | End: 2024-10-21

## 2024-07-23 ASSESSMENT — PAIN SCALES - GENERAL: PAINLEVEL: 5

## 2024-07-23 NOTE — TELEPHONE ENCOUNTER
----- Message from Lola Sinha sent at 7/23/2024 10:41 AM EDT -----  Can you pls obtain an EMG report from Dr. Diaz office? She reportedly did it in the past year.  thanks

## 2024-08-01 ENCOUNTER — PHARMACY VISIT (OUTPATIENT)
Dept: PHARMACY | Facility: CLINIC | Age: 74
End: 2024-08-01
Payer: MEDICAID

## 2024-08-31 ENCOUNTER — PHARMACY VISIT (OUTPATIENT)
Dept: PHARMACY | Facility: CLINIC | Age: 74
End: 2024-08-31
Payer: MEDICAID

## 2024-08-31 PROCEDURE — RXMED WILLOW AMBULATORY MEDICATION CHARGE

## 2024-09-04 ENCOUNTER — APPOINTMENT (OUTPATIENT)
Dept: RHEUMATOLOGY | Facility: CLINIC | Age: 74
End: 2024-09-04
Payer: COMMERCIAL

## 2024-09-04 ENCOUNTER — OFFICE VISIT (OUTPATIENT)
Dept: RHEUMATOLOGY | Facility: CLINIC | Age: 74
End: 2024-09-04
Payer: COMMERCIAL

## 2024-09-04 VITALS
BODY MASS INDEX: 24.48 KG/M2 | WEIGHT: 133 LBS | OXYGEN SATURATION: 98 % | HEART RATE: 83 BPM | HEIGHT: 62 IN | SYSTOLIC BLOOD PRESSURE: 130 MMHG | DIASTOLIC BLOOD PRESSURE: 87 MMHG

## 2024-09-04 DIAGNOSIS — M81.0 AGE-RELATED OSTEOPOROSIS WITHOUT CURRENT PATHOLOGICAL FRACTURE: ICD-10-CM

## 2024-09-04 DIAGNOSIS — M1A.09X0 IDIOPATHIC CHRONIC GOUT OF MULTIPLE SITES WITHOUT TOPHUS: Primary | ICD-10-CM

## 2024-09-04 DIAGNOSIS — E55.9 HYPOVITAMINOSIS D: ICD-10-CM

## 2024-09-04 PROCEDURE — 3075F SYST BP GE 130 - 139MM HG: CPT | Performed by: INTERNAL MEDICINE

## 2024-09-04 PROCEDURE — 1125F AMNT PAIN NOTED PAIN PRSNT: CPT | Performed by: INTERNAL MEDICINE

## 2024-09-04 PROCEDURE — 99211 OFF/OP EST MAY X REQ PHY/QHP: CPT | Performed by: INTERNAL MEDICINE

## 2024-09-04 PROCEDURE — 3079F DIAST BP 80-89 MM HG: CPT | Performed by: INTERNAL MEDICINE

## 2024-09-04 PROCEDURE — 1159F MED LIST DOCD IN RCRD: CPT | Performed by: INTERNAL MEDICINE

## 2024-09-04 PROCEDURE — 1157F ADVNC CARE PLAN IN RCRD: CPT | Performed by: INTERNAL MEDICINE

## 2024-09-04 PROCEDURE — 3008F BODY MASS INDEX DOCD: CPT | Performed by: INTERNAL MEDICINE

## 2024-09-04 ASSESSMENT — ROUTINE ASSESSMENT OF PATIENT INDEX DATA (RAPID3)
DRESS_YOURSELF: WITH SOME DIFFICULTY
FN_SCORE: 2.3
GOOD_NIGHTS_SLEEP: WITH MUCH DIFFICULTY
WALK_KILOMETERS: WITH MUCH DIFFICULTY
TOTAL RAPID3 SCORE: 6.8
TURN_FAUCETS_OFF: WITHOUT ANY DIFFICULTY
ON A SCALE OF ONE TO TEN, CONSIDERING ALL THE WAYS IN WHICH ILLNESS AND HEALTH CONDITIONS MAY AFFECT YOU AT THIS TIME, PLEASE INDICATE BELOW HOW YOU ARE DOING:: 2.5
FEELINGS_ANXIETY_NERVOUS: WITH SOME DIFFICULTY
WASH_DRY_BODY: WITHOUT ANY DIFFICULTY
ON A SCALE OF ONE TO TEN, HOW MUCH PAIN HAVE YOU HAD BECAUSE OF YOUR CONDITION OVER THE PAST WEEK?: 2
PARTIPATE_RECREATIONAL_ACTIVITIES: WITH SOME DIFFICULTY
IN_OUT_BED: WITH SOME DIFFICULTY
WEIGHTED_TOTAL_SCORE: 2.27
IN_OUT_TRANSPORT: WITH SOME DIFFICULTY
SUM OF QUESTIONS A TO J: 7
SEVERITY_SCORE: 0
ON A SCALE OF ONE TO TEN, HOW MUCH PAIN HAVE YOU HAD BECAUSE OF YOUR CONDITION OVER THE PAST WEEK?: 2
PICK_CLOTHES_OFF_FLOOR: WITH SOME DIFFICULTY
WALK_FLAT_GROUND: WITHOUT ANY DIFFICULTY
FEELINGS_DEPRESSION: WITH SOME DIFFICULTY
SEVERITY_SCORE: MODERATE SEVERITY (MS)
ON A SCALE OF ONE TO TEN, CONSIDERING ALL THE WAYS IN WHICH ILLNESS AND HEALTH CONDITIONS MAY AFFECT YOU AT THIS TIME, PLEASE INDICATE BELOW HOW YOU ARE DOING:: 2.5
LIFT_CUP_TO_MOUTH: WITHOUT ANY DIFFICULTY

## 2024-09-04 ASSESSMENT — ENCOUNTER SYMPTOMS
OCCASIONAL FEELINGS OF UNSTEADINESS: 0
DEPRESSION: 0
LOSS OF SENSATION IN FEET: 0

## 2024-09-04 ASSESSMENT — PAIN SCALES - GENERAL: PAINLEVEL: 2

## 2024-09-04 NOTE — PROGRESS NOTES
"Chief Complaint:    This 74 y.o. female presents with the chief complaint of osteoporosis (OP).     Subjective:   HPI   Problem:  1: ***    Review of Systems    Objective:   /87   Pulse 83   Ht 1.562 m (5' 1.5\")   Wt 60.3 kg (133 lb)   SpO2 98%   BMI 24.72 kg/m²      Physical Exam     Assessment:   ***    Plan:    ***         Dionisio Curtis MD   "

## 2024-09-05 ENCOUNTER — APPOINTMENT (OUTPATIENT)
Dept: RHEUMATOLOGY | Facility: CLINIC | Age: 74
End: 2024-09-05
Payer: COMMERCIAL

## 2024-09-11 ENCOUNTER — APPOINTMENT (OUTPATIENT)
Dept: RHEUMATOLOGY | Facility: CLINIC | Age: 74
End: 2024-09-11
Payer: COMMERCIAL

## 2024-09-17 ENCOUNTER — TELEPHONE (OUTPATIENT)
Dept: PRIMARY CARE | Facility: CLINIC | Age: 74
End: 2024-09-17
Payer: COMMERCIAL

## 2024-09-17 PROCEDURE — RXMED WILLOW AMBULATORY MEDICATION CHARGE

## 2024-09-17 NOTE — TELEPHONE ENCOUNTER
Patient needs RX for Handicap Placard.  Patient does use cane to walk. Please mail to patient. Any questions call Patient [phone].

## 2024-09-18 ENCOUNTER — PHARMACY VISIT (OUTPATIENT)
Dept: PHARMACY | Facility: CLINIC | Age: 74
End: 2024-09-18
Payer: MEDICAID

## 2024-09-19 DIAGNOSIS — M19.90 OSTEOARTHRITIS, UNSPECIFIED OSTEOARTHRITIS TYPE, UNSPECIFIED SITE: Primary | ICD-10-CM

## 2024-10-15 ENCOUNTER — PHARMACY VISIT (OUTPATIENT)
Dept: PHARMACY | Facility: CLINIC | Age: 74
End: 2024-10-15
Payer: MEDICAID

## 2024-10-15 PROCEDURE — RXMED WILLOW AMBULATORY MEDICATION CHARGE

## 2024-10-16 ASSESSMENT — ENCOUNTER SYMPTOMS
SINUS PRESSURE: 0
NAUSEA: 0
TREMORS: 0
DYSURIA: 0
COUGH: 0
MYALGIAS: 0
FREQUENCY: 0
SEIZURES: 0
CHILLS: 0
WHEEZING: 0
ARTHRALGIAS: 1
PALPITATIONS: 0
ABDOMINAL PAIN: 0
FATIGUE: 0
BLOOD IN STOOL: 0
UNEXPECTED WEIGHT CHANGE: 0
VOMITING: 0
BACK PAIN: 1
NUMBNESS: 0
POLYDIPSIA: 0
TROUBLE SWALLOWING: 0
POLYPHAGIA: 0
SHORTNESS OF BREATH: 0

## 2024-10-16 NOTE — PROGRESS NOTES
Subjective   Patient ID: Christiana Ferguson is a 74 y.o. female who presents for Annual Exam.    HPI     Hypertension, EDWAR, family history of premature CAD  MGUS, carpal tunnel, fibromyalgia, osteoarthritis, anxiety disorder       H/O Hypertension- seen Dr Martinez, in 2023 for elevated blood pressure reading  Also seen Dr Boland at at [facility] in February 2024  Stated she was started on hydrochlorothiazide, which she didn't take due to dizziness    H/O Osteoporosis- seeing Jeovany    H/O Fibromyalgia, chronic pain, seeing pain management Dr Veliz, getting acupuncture  Had right shoulder taped    H/O Sleep disorder, anxiety- sees psychiatrist at [facility]    MGUS- saw hematologist in 2021, she had testing after 24 and me test showed she has high M protein. Was advised by hematology that she doesn't need further follow up     Refused age appropriate vaccinations        Review of Systems   Constitutional:  Negative for chills, fatigue and unexpected weight change.   HENT:  Negative for postnasal drip, sinus pressure and trouble swallowing.    Respiratory:  Negative for cough, shortness of breath and wheezing.    Cardiovascular:  Negative for chest pain, palpitations and leg swelling.   Gastrointestinal:  Negative for abdominal pain, blood in stool, nausea and vomiting.   Endocrine: Negative for polydipsia, polyphagia and polyuria.   Genitourinary:  Negative for dysuria and frequency.   Musculoskeletal:  Positive for arthralgias and back pain. Negative for myalgias.   Skin:  Negative for rash.   Neurological:  Negative for tremors, seizures and numbness.   Psychiatric/Behavioral:  Negative for behavioral problems.        Objective   /82 (BP Location: Left arm, Patient Position: Sitting, BP Cuff Size: Adult)   Pulse 71   Temp 36.4 °C (97.5 °F) (Temporal)   Wt 60.3 kg (133 lb)   SpO2 97%   BMI 24.72 kg/m²     Physical Exam  Constitutional:       General: She is not in acute distress.     Appearance:  Normal appearance.   HENT:      Head: Normocephalic and atraumatic.   Eyes:      Extraocular Movements: Extraocular movements intact.      Pupils: Pupils are equal, round, and reactive to light.   Cardiovascular:      Rate and Rhythm: Normal rate and regular rhythm.      Heart sounds: Normal heart sounds. No murmur heard.     No friction rub.   Pulmonary:      Effort: Pulmonary effort is normal.      Breath sounds: Normal breath sounds. No wheezing or rales.   Abdominal:      General: Bowel sounds are normal.      Palpations: Abdomen is soft.      Tenderness: There is no abdominal tenderness. There is no guarding.   Musculoskeletal:      Cervical back: Normal range of motion and neck supple.      Right lower leg: No edema.      Left lower leg: No edema.      Comments: Reduced range of motion of right shoulder   Lymphadenopathy:      Cervical: No cervical adenopathy.   Skin:     General: Skin is warm and dry.      Findings: No rash.   Neurological:      General: No focal deficit present.      Mental Status: She is alert and oriented to person, place, and time.      Cranial Nerves: No cranial nerve deficit.      Gait: Gait normal.   Psychiatric:         Mood and Affect: Mood normal.         Assessment/Plan        Chrsitiana was seen today for annual exam.  Diagnoses and all orders for this visit:  Medicare annual wellness visit, subsequent (Primary)  Primary hypertension  -     amLODIPine (Norvasc) 10 mg tablet; Take 1 tablet (10 mg) by mouth once daily.  Primary hyperparathyroidism (Multi)  Fibromyalgia  Osteoporosis, unspecified osteoporosis type, unspecified pathological fracture presence  Disorder of connective tissue (Multi)      Past Medical History:   Diagnosis Date    Age-related nuclear cataract of both eyes     Anxiety     Dry eye syndrome of bilateral lacrimal glands     Neck pain     Unspecified disorder of refraction      History reviewed. No pertinent surgical history.    Advised to continue with losartan  and amlodipine  No need to take hydrochlorothiazide due to dizziness  Follow-up with home blood pressure readings goal reading less than 140/80    Increased amlodipine to 10 mg from 5 mg  Due for labs    Current Outpatient Medications   Medication Instructions    acetaminophen (Tylenol) 325 mg tablet 2 tablets, Every 6 hours PRN    alendronate (FOSAMAX) 70 mg, oral, Every 7 days, Take in the morning with a full glass of water, on an empty stomach, and do not take anything else by mouth or lie down for the next 30 min.    amLODIPine (NORVASC) 10 mg, oral, Daily    busPIRone (Buspar) 15 mg tablet 1 tablet, 3 times daily    calcium carbonate EX (Tums E-X) 300 mg (750 mg) chewable tablet 1 tablet, Daily    cholecalciferol (Vitamin D-3) 5,000 Units tablet as directed Orally    clonazePAM (KlonoPIN) 0.5 mg tablet 1 tablet, 2 times daily PRN    diclofenac sodium (Voltaren) 1 % gel     divalproex (Depakote ER) 250 mg 24 hr tablet 1-2 tablets, Nightly PRN    gabapentin (NEURONTIN) 400 mg, oral, 3 times daily    ibuprofen 800 mg, Every 6 hours PRN    losartan (Cozaar) 100 mg tablet Take 1 tablet by mouth once daily    risedronate (Actonel) 150 mg tablet PLEASE SEE ATTACHED FOR DETAILED DIRECTIONS    zolpidem (AMBIEN) 20 mg, Nightly PRN

## 2024-10-17 ENCOUNTER — OFFICE VISIT (OUTPATIENT)
Dept: PRIMARY CARE | Facility: CLINIC | Age: 74
End: 2024-10-17
Payer: COMMERCIAL

## 2024-10-17 VITALS
OXYGEN SATURATION: 97 % | HEART RATE: 71 BPM | WEIGHT: 133 LBS | BODY MASS INDEX: 24.72 KG/M2 | TEMPERATURE: 97.5 F | DIASTOLIC BLOOD PRESSURE: 82 MMHG | SYSTOLIC BLOOD PRESSURE: 136 MMHG

## 2024-10-17 DIAGNOSIS — M79.7 FIBROMYALGIA: ICD-10-CM

## 2024-10-17 DIAGNOSIS — I10 PRIMARY HYPERTENSION: ICD-10-CM

## 2024-10-17 DIAGNOSIS — E21.0 PRIMARY HYPERPARATHYROIDISM (MULTI): ICD-10-CM

## 2024-10-17 DIAGNOSIS — M35.9 DISORDER OF CONNECTIVE TISSUE (MULTI): ICD-10-CM

## 2024-10-17 DIAGNOSIS — M81.0 OSTEOPOROSIS, UNSPECIFIED OSTEOPOROSIS TYPE, UNSPECIFIED PATHOLOGICAL FRACTURE PRESENCE: ICD-10-CM

## 2024-10-17 DIAGNOSIS — Z00.00 MEDICARE ANNUAL WELLNESS VISIT, SUBSEQUENT: Primary | ICD-10-CM

## 2024-10-17 PROCEDURE — 1157F ADVNC CARE PLAN IN RCRD: CPT | Performed by: INTERNAL MEDICINE

## 2024-10-17 PROCEDURE — 3075F SYST BP GE 130 - 139MM HG: CPT | Performed by: INTERNAL MEDICINE

## 2024-10-17 PROCEDURE — 1125F AMNT PAIN NOTED PAIN PRSNT: CPT | Performed by: INTERNAL MEDICINE

## 2024-10-17 PROCEDURE — 3079F DIAST BP 80-89 MM HG: CPT | Performed by: INTERNAL MEDICINE

## 2024-10-17 PROCEDURE — RXMED WILLOW AMBULATORY MEDICATION CHARGE

## 2024-10-17 PROCEDURE — 1159F MED LIST DOCD IN RCRD: CPT | Performed by: INTERNAL MEDICINE

## 2024-10-17 PROCEDURE — 99397 PER PM REEVAL EST PAT 65+ YR: CPT | Performed by: INTERNAL MEDICINE

## 2024-10-17 PROCEDURE — 4004F PT TOBACCO SCREEN RCVD TLK: CPT | Performed by: INTERNAL MEDICINE

## 2024-10-17 RX ORDER — AMLODIPINE BESYLATE 10 MG/1
10 TABLET ORAL DAILY
Qty: 90 TABLET | Refills: 1 | Status: SHIPPED | OUTPATIENT
Start: 2024-10-17 | End: 2025-04-15

## 2024-10-17 ASSESSMENT — ENCOUNTER SYMPTOMS
LOSS OF SENSATION IN FEET: 0
DEPRESSION: 0
OCCASIONAL FEELINGS OF UNSTEADINESS: 0

## 2024-10-17 ASSESSMENT — PAIN SCALES - GENERAL: PAINLEVEL_OUTOF10: 3

## 2024-10-18 ENCOUNTER — PHARMACY VISIT (OUTPATIENT)
Dept: PHARMACY | Facility: CLINIC | Age: 74
End: 2024-10-18
Payer: MEDICAID

## 2024-10-21 NOTE — PATIENT INSTRUCTIONS
-Try Tumeric 1000 mg per day +curcumin daily    -Increase gabapentin to 600 mg three times per slowly by increasing changing 1 pill at a time every few days as tolerated. Take the lowest most effective dose with the least amount of side effects, maximum is 1200 mg three times per day  -Consider injections: Trigger point injections (handout provided previously), ultrasound-guided subacromial injection on the right, carpal tunnel injections , pes anserinus bursitis injection, referral for neck injections  -Continue Exercises as discussed  -Follow-up in 4 months or sooner if needed.

## 2024-10-21 NOTE — PROGRESS NOTES
"  Chief complaint: Fibromyalgia, neck pain follow-up    This is a pleasant 74 y.o. right handed woman with past medical history significant for HTN, osteoporosis, primary hyperparathyroidism, cataract, who presents for follow-up of fibromyalgia and neck pain.     She was last seen here on 7/23/2024, at which point I advised her to continue turmeric and gabapentin.  She realizes that she is not taking the turmeric anymore, she does not know why.  She is taking gabapentin 400 mg twice a day, and is interested in going up.    I got her EMG reports from the neurology office:    EMG report received from associates in neurology, performed by Dr. Crain, 1/9/2024  Impression: Mild chronic C6 and C7 radiculopathy bilaterally worse on the left.  Moderate bilateral carpal tunnel syndrome at the wrist worse on the right  Report personally reviewed and interpreted today, will be scanned into our system (s/p R CTR, helped)    She had a carpal tunnel release surgery on the right in the past year, but did not do it on the left.  She still has symptoms on the left, but is not ready for injections at this point.    Addendum 9/27/2024  EMG report from 7/17/2017 received and reviewed, will be scanned into our system, also performed by Dr. Crain  Moderate right carpal tunnel syndrome     I gave her some exercises to do for the hip.  Twice a week she is doing some strengthening and stretching exercises, once a week she is doing Pilates and bike machine.  Sometimes she does acupuncture and chiropractic manipulation    \"I have good days and bad days\", mostly depending on her levels of activity the day before.    She would like to continue more on focusing on strengthening exercises before considering any further interventions as discussed below.    She rates her pain as 3-6/10, previously 5/10    Otherwise, there have been no changes to her medications or past medical history since last " "visit    _________________________________  7/23/2024: continue meds, hip exercises provided, consider other meds, injections in the future.  10/22/2024: Try turmeric at 1000 mg, increase gabapentin, continue exercises, consider other medications and injections in the future   __________________________________  As a reminder:  TIMELINE OF COMPLAINT(S):    She has pain in many different areas, remembering that she \"always hurts\" for many decades, but got especially severe in the past 15 years, with no inciting or traumatic event.  There was some whiplash injuries, falls over ice over the years, but nothing severe.      At this point, her right shoulder and neck, right arm is the most bothersome.      She also has right anterior medial knee pain that began a couple months ago.    Pain management note from Dr. Rodriguez 4/8/2024 personally reviewed today.  He recommended conservative management including physical therapy, acupuncture, traction and she has been doing this for the past couple of months.    PCP note 3/25/2024 personally reviewed today.    CTS R and trigger finger release surgeries last year helped.  She had an EMG done at an outside facility, she will try to get me the report.    Overall, she has chronic pain, but manages it generally, except for when she has a flareup.  This past April she had a big flareup after she took a [employer] trip to [place]      Pain:  LOCATION-right arm, shoulder blade posterior and lateral shoulder, R neck, right knee atnerior medial aspect  RADIATION-  CONSTANT or INTERMITTENT- Constant  SEVERITY/HKOEEYDN-9-6/10  QUALITY- aching, burning, dull, pressure, sharp, and throbbing, pressure  WEAKNESS-left hip  NUMBNESS/TINGLING-bilateral hands  ASSOCIATED WITH- Crackling and Snapping  EXACERBATED BY-overdoing activities, not sleeping  BETTER WITH-heat, massage, traction manipulation, medications, rest  TRIED- Tylenol helps sometimes      Anti-Inflammatories: Ibuprofen helps, " "previously turmeric but stopped taking it for some reason it did help, previously Celebrex not sure if it helped      Muscle relaxants:      Anti-depressants:      Neuroleptics: Gabapentin 100 mg BID      LDN:    PHYSICAL THERAPY: Yes, March and April 2024, general strength and stretching, helps a little. Doing HEP.   CHIROPRACTIC MANIPULATION: Yes  TENS unit: No  ACUPUNCTURE TREATMENTS: Yes, maybe helped  DEEP TISSUE MASSAGE THERAPY: No  OSTEOPATHIC MANIPULATION THERAPY: No    EMG/NCS:  -EMG report received from associates in neurology, performed by Dr. Crain, 1/9/2024  Impression: Mild chronic C6 and C7 radiculopathy bilaterally worse on the left.  Moderate bilateral carpal tunnel syndrome at the wrist worse on the right    -EMG report from 7/17/2017 received and reviewed, will be scanned into our system, also performed by Dr. Crain  Moderate right carpal tunnel syndrome     INJECTIONS: one injection \"near shoulder blade\", reports of previous LUIS's \"helping\" but pt didn't remember it helping.     IMAGING:    === 02/29/24 ===    XR KNEE 3 VIEWS RIGHT    - Impression -  No evidence for acute osseous abnormality. No significant interval  change, although there may be slight narrowing of the lateral  compartment.    Left elbow x-ray 11/23/2022: Mild degenerative changes    Left shoulder x-ray 11/23/2022: Mild degenerative changes at the acromioclavicular joint    Right shoulder x-ray 10/10/2022: Mild glenohumeral osteoarthritis    Right hand x-ray 10/10/2022: Osteoporosis, no other pathologies    Bilateral hand x-rays 8/4/2022: No acute osseous abnormalities.  Previously seen nondisplaced fracture through the right thumb not well-seen.    MRI cervical spine 4/12/2021:    IMPRESSION:     1. Diffuse cervical disc/joint degeneration.     2.  Significant bilateral C5-6 and left C4-5 and right C6-7 bony foramina   narrowing.     3. Patent remaining cervical canal and foramen.     X-ray thoracic and lumbar spine " 9/5/2019:  Vertebral body heights:  Mild superior endplate compression deformity of L2. Remainder of vertebral  body heights maintained.     Disc spaces:  Disc space narrowing, endplate irregularity and osteophyte formation most  pronounced L4-5 from chronic degenerative change.    Neck xray pending  Lab Results   Component Value Date    HGBA1C 6.1 03/25/2024       FUNCTIONAL HISTORY: The patient is independent in all ADLs, mobility, and driving. The patient uses a cane for long distances jkl;'    SH:  Lives in: [place]  Lives with: Alone  Occupation: Retired teacher  Tobacco: 1 pack every 2 weeks  Alcohol: On holidays, few times per year  Drugs: No    _______________________________  ROS: The patient denies any bowel or bladder incontinence/accidents, night sweats, fevers, chills, recent significant weight loss. A 14 point review of systems was reviewed with the patient and is as above and otherwise negative.  ROS questionnaire positive for numbness and tingling, weakness, muscle pain/tightness, joint pain in the right shoulder, low back pain, anxiety, sleep disturbances      PHYSICAL EXAM    GEN - Alert, well-developed, well-nourished, no acute distress  PSYCH - Cooperative, appropriate mood and affect  HEENT - NC/AT  RESP - Non-labored respirations, equal expansion  CV - warm and well-perfused, No cyanosis or edema in extremities.  ABD- soft, ND  SKIN - No rash.    Previous:    NECK/EXTR- Cervical ROM is full with forward flexion, extension, rotation, and side bending with mild provocation of the endpoints with side rotation and bending to the right. Spurlings and Lhermitte's negative. No tenderness of the cervical spinous processes.  There was tenderness of the cervical paraspinal muscles and trapezei musculature as well as the scapular musculature  Right worse than left.  Scapulae are symmetrical without evidence of medial or lateral winging.    Shoulder diminished at the endpoints of forward flexion,  abduction, and internal rotation with provocation of pain.  Positive Neer test, positive Richardson test, positive empty can test.  Rotator cuff muscles were weak due to pain.      Knee: No warmth, effusion, or erythema.  No popliteal fullness.  No anterior, posterior, medial or lateral instability.  There was significant pes anserine tenderness on the right.  There is no pain with hyperflexion of the knee.  There is no pain with varus or valgus stressing of the knee during flexion and extension.  There is no crepitus.  There is no medial or lateral joint line tenderness.    NEURO -   UE strength 5/5 -  including shoulder abduction (4/5 on the right), biceps, triceps, wrist extensors, finger flexors, interossei, and    LE strength 5/5 -  including hip flexors (5 -/5 bilaterally), knee flexors, knee extensors, ankle dorsiflexors, ankle plantar flexors, and EHL   Sensation - intact to light touch in bilateral lower and upper extremities.   Reflexes - 2+ biceps, brachioradialis, triceps, 1+ patellar and Achilles reflexes bilaterally No Clonus, No Babinski, Mccain's negative bilaterally  GAIT - Normal base, normal stride length, non-antalgic. Able to toe walk and heel walk without difficulty.  Right shoulder droop compared to left    IMPRESSION:    This is a pleasant 74 y.o. right handed woman with past medical history significant for HTN, osteoporosis, primary hyperparathyroidism, cataract, who presents for follow-up of fibromyalgia and neck pain.  Physical exam is reassuring for lack of neurologic compromise.  Symptoms of physical exam findings in this complex patient are likely multifactorial in nature due to combination of right rotator cuff pathology, cervical spondylosis and reactive myofascial pain/tension, pes anserinus bursitis on the right, exacerbated by underlying deconditioning and fibromyalgia.       -Try Tumeric 1000 mg per day +curcumin daily    -Increase gabapentin to 600 mg three times per slowly by  increasing changing 1 pill at a time every few days as tolerated. Take the lowest most effective dose with the least amount of side effects, maximum is 1200 mg three times per day  -Consider injections: Trigger point injections (handout provided previously), ultrasound-guided subacromial injection on the right, carpal tunnel injections , pes anserinus bursitis injection, referral for neck injections  -Continue Exercises as discussed  -Follow-up in 4 months or sooner if needed.        The patient expressed understanding and agreement with the assessment and plan. Patient encouraged to contact us should they have any questions, concerns, or any changes in symptoms.     Thank you for allowing me to participate in the care of your patient.      ** Dictated with voice recognition software, please forgive any errors in grammar and/or spelling **

## 2024-10-22 ENCOUNTER — APPOINTMENT (OUTPATIENT)
Dept: PHYSICAL MEDICINE AND REHAB | Facility: CLINIC | Age: 74
End: 2024-10-22
Payer: COMMERCIAL

## 2024-10-22 VITALS
DIASTOLIC BLOOD PRESSURE: 77 MMHG | SYSTOLIC BLOOD PRESSURE: 132 MMHG | WEIGHT: 133 LBS | BODY MASS INDEX: 24.48 KG/M2 | OXYGEN SATURATION: 97 % | HEART RATE: 76 BPM | HEIGHT: 62 IN | TEMPERATURE: 96.9 F

## 2024-10-22 DIAGNOSIS — M67.911 ROTATOR CUFF DYSFUNCTION, RIGHT: Primary | ICD-10-CM

## 2024-10-22 DIAGNOSIS — M79.7 FIBROMYALGIA: ICD-10-CM

## 2024-10-22 DIAGNOSIS — M54.2 NECK PAIN: ICD-10-CM

## 2024-10-22 DIAGNOSIS — M79.601 RIGHT ARM PAIN: ICD-10-CM

## 2024-10-22 DIAGNOSIS — R52 CHRONIC PAIN OF MULTIPLE SITES: ICD-10-CM

## 2024-10-22 DIAGNOSIS — G89.29 CHRONIC PAIN OF MULTIPLE SITES: ICD-10-CM

## 2024-10-22 DIAGNOSIS — M79.18 MYOFASCIAL PAIN: ICD-10-CM

## 2024-10-22 DIAGNOSIS — M79.641 RIGHT HAND PAIN: ICD-10-CM

## 2024-10-22 DIAGNOSIS — M70.51 PES ANSERINUS BURSITIS OF RIGHT KNEE: ICD-10-CM

## 2024-10-22 PROCEDURE — RXMED WILLOW AMBULATORY MEDICATION CHARGE

## 2024-10-22 PROCEDURE — 99213 OFFICE O/P EST LOW 20 MIN: CPT | Performed by: PHYSICAL MEDICINE & REHABILITATION

## 2024-10-22 PROCEDURE — 3078F DIAST BP <80 MM HG: CPT | Performed by: PHYSICAL MEDICINE & REHABILITATION

## 2024-10-22 PROCEDURE — 3008F BODY MASS INDEX DOCD: CPT | Performed by: PHYSICAL MEDICINE & REHABILITATION

## 2024-10-22 PROCEDURE — 1159F MED LIST DOCD IN RCRD: CPT | Performed by: PHYSICAL MEDICINE & REHABILITATION

## 2024-10-22 PROCEDURE — 1160F RVW MEDS BY RX/DR IN RCRD: CPT | Performed by: PHYSICAL MEDICINE & REHABILITATION

## 2024-10-22 PROCEDURE — 3075F SYST BP GE 130 - 139MM HG: CPT | Performed by: PHYSICAL MEDICINE & REHABILITATION

## 2024-10-22 PROCEDURE — 1125F AMNT PAIN NOTED PAIN PRSNT: CPT | Performed by: PHYSICAL MEDICINE & REHABILITATION

## 2024-10-22 PROCEDURE — 1157F ADVNC CARE PLAN IN RCRD: CPT | Performed by: PHYSICAL MEDICINE & REHABILITATION

## 2024-10-22 RX ORDER — GABAPENTIN 600 MG/1
600 TABLET ORAL 3 TIMES DAILY
Qty: 90 TABLET | Refills: 3 | Status: SHIPPED | OUTPATIENT
Start: 2024-10-22 | End: 2025-02-19

## 2024-10-22 ASSESSMENT — PAIN SCALES - GENERAL: PAINLEVEL_OUTOF10: 4

## 2024-11-02 ENCOUNTER — LAB (OUTPATIENT)
Dept: LAB | Facility: LAB | Age: 74
End: 2024-11-02
Payer: COMMERCIAL

## 2024-11-02 DIAGNOSIS — F41.9 ANXIETY: ICD-10-CM

## 2024-11-02 DIAGNOSIS — R79.89 ELEVATED VITAMIN B12 LEVEL: ICD-10-CM

## 2024-11-02 DIAGNOSIS — D47.2 MGUS (MONOCLONAL GAMMOPATHY OF UNKNOWN SIGNIFICANCE): ICD-10-CM

## 2024-11-02 DIAGNOSIS — R73.03 PREDIABETES: ICD-10-CM

## 2024-11-02 DIAGNOSIS — E78.5 DYSLIPIDEMIA: ICD-10-CM

## 2024-11-02 DIAGNOSIS — I10 PRIMARY HYPERTENSION: ICD-10-CM

## 2024-11-02 DIAGNOSIS — E55.9 VITAMIN D DEFICIENCY: ICD-10-CM

## 2024-11-02 LAB
25(OH)D3 SERPL-MCNC: 71 NG/ML (ref 30–100)
ALBUMIN SERPL BCP-MCNC: 4.6 G/DL (ref 3.4–5)
ALP SERPL-CCNC: 105 U/L (ref 33–136)
ALT SERPL W P-5'-P-CCNC: 10 U/L (ref 7–45)
ANION GAP SERPL CALC-SCNC: 15 MMOL/L (ref 10–20)
AST SERPL W P-5'-P-CCNC: 19 U/L (ref 9–39)
BASOPHILS # BLD AUTO: 0.05 X10*3/UL (ref 0–0.1)
BASOPHILS NFR BLD AUTO: 0.8 %
BILIRUB SERPL-MCNC: 0.2 MG/DL (ref 0–1.2)
BUN SERPL-MCNC: 17 MG/DL (ref 6–23)
CALCIUM SERPL-MCNC: 9.6 MG/DL (ref 8.6–10.6)
CHLORIDE SERPL-SCNC: 106 MMOL/L (ref 98–107)
CHOLEST SERPL-MCNC: 185 MG/DL (ref 0–199)
CHOLESTEROL/HDL RATIO: 3.1
CO2 SERPL-SCNC: 27 MMOL/L (ref 21–32)
CREAT SERPL-MCNC: 0.93 MG/DL (ref 0.5–1.05)
EGFRCR SERPLBLD CKD-EPI 2021: 65 ML/MIN/1.73M*2
EOSINOPHIL # BLD AUTO: 0.11 X10*3/UL (ref 0–0.4)
EOSINOPHIL NFR BLD AUTO: 1.8 %
ERYTHROCYTE [DISTWIDTH] IN BLOOD BY AUTOMATED COUNT: 15.7 % (ref 11.5–14.5)
EST. AVERAGE GLUCOSE BLD GHB EST-MCNC: 117 MG/DL
GLUCOSE SERPL-MCNC: 96 MG/DL (ref 74–99)
HBA1C MFR BLD: 5.7 %
HCT VFR BLD AUTO: 43.8 % (ref 36–46)
HDLC SERPL-MCNC: 58.8 MG/DL
HGB BLD-MCNC: 13.6 G/DL (ref 12–16)
IMM GRANULOCYTES # BLD AUTO: 0.01 X10*3/UL (ref 0–0.5)
IMM GRANULOCYTES NFR BLD AUTO: 0.2 % (ref 0–0.9)
LDLC SERPL CALC-MCNC: 110 MG/DL
LYMPHOCYTES # BLD AUTO: 2.87 X10*3/UL (ref 0.8–3)
LYMPHOCYTES NFR BLD AUTO: 45.8 %
MCH RBC QN AUTO: 27.5 PG (ref 26–34)
MCHC RBC AUTO-ENTMCNC: 31.1 G/DL (ref 32–36)
MCV RBC AUTO: 89 FL (ref 80–100)
MONOCYTES # BLD AUTO: 0.8 X10*3/UL (ref 0.05–0.8)
MONOCYTES NFR BLD AUTO: 12.8 %
NEUTROPHILS # BLD AUTO: 2.43 X10*3/UL (ref 1.6–5.5)
NEUTROPHILS NFR BLD AUTO: 38.6 %
NON HDL CHOLESTEROL: 126 MG/DL (ref 0–149)
NRBC BLD-RTO: 0 /100 WBCS (ref 0–0)
PLATELET # BLD AUTO: 279 X10*3/UL (ref 150–450)
POTASSIUM SERPL-SCNC: 4.5 MMOL/L (ref 3.5–5.3)
PROT SERPL-MCNC: 7.9 G/DL (ref 6.4–8.2)
PROT SERPL-MCNC: 7.9 G/DL (ref 6.4–8.2)
RBC # BLD AUTO: 4.95 X10*6/UL (ref 4–5.2)
SODIUM SERPL-SCNC: 143 MMOL/L (ref 136–145)
TRIGL SERPL-MCNC: 79 MG/DL (ref 0–149)
TSH SERPL-ACNC: 1.15 MIU/L (ref 0.44–3.98)
VIT B12 SERPL-MCNC: 962 PG/ML (ref 211–911)
VLDL: 16 MG/DL (ref 0–40)
WBC # BLD AUTO: 6.3 X10*3/UL (ref 4.4–11.3)

## 2024-11-02 PROCEDURE — 82306 VITAMIN D 25 HYDROXY: CPT

## 2024-11-02 PROCEDURE — 84155 ASSAY OF PROTEIN SERUM: CPT

## 2024-11-02 PROCEDURE — 80061 LIPID PANEL: CPT

## 2024-11-02 PROCEDURE — 82607 VITAMIN B-12: CPT

## 2024-11-02 PROCEDURE — 85025 COMPLETE CBC W/AUTO DIFF WBC: CPT

## 2024-11-02 PROCEDURE — 36415 COLL VENOUS BLD VENIPUNCTURE: CPT

## 2024-11-02 PROCEDURE — 86334 IMMUNOFIX E-PHORESIS SERUM: CPT

## 2024-11-02 PROCEDURE — 80053 COMPREHEN METABOLIC PANEL: CPT

## 2024-11-02 PROCEDURE — 83036 HEMOGLOBIN GLYCOSYLATED A1C: CPT

## 2024-11-02 PROCEDURE — 84165 PROTEIN E-PHORESIS SERUM: CPT

## 2024-11-02 PROCEDURE — 84443 ASSAY THYROID STIM HORMONE: CPT

## 2024-11-06 LAB
ALBUMIN: 4.5 G/DL (ref 3.4–5)
ALPHA 1 GLOBULIN: 0.3 G/DL (ref 0.2–0.6)
ALPHA 2 GLOBULIN: 0.8 G/DL (ref 0.4–1.1)
BETA GLOBULIN: 0.8 G/DL (ref 0.5–1.2)
GAMMA GLOBULIN: 1.5 G/DL (ref 0.5–1.4)
IMMUNOFIXATION COMMENT: NORMAL
M-PROTEIN 1: 0.5 G/DL
PATH REVIEW - SERUM IMMUNOFIXATION: NORMAL
PATH REVIEW-SERUM PROTEIN ELECTROPHORESIS: ABNORMAL
PROTEIN ELECTROPHORESIS COMMENT: ABNORMAL

## 2024-11-07 ENCOUNTER — PHARMACY VISIT (OUTPATIENT)
Dept: PHARMACY | Facility: CLINIC | Age: 74
End: 2024-11-07
Payer: MEDICAID

## 2024-11-14 DIAGNOSIS — M81.0 AGE RELATED OSTEOPOROSIS, UNSPECIFIED PATHOLOGICAL FRACTURE PRESENCE: ICD-10-CM

## 2024-11-14 PROCEDURE — RXMED WILLOW AMBULATORY MEDICATION CHARGE

## 2024-11-14 RX ORDER — ALENDRONATE SODIUM 70 MG/1
70 TABLET ORAL
Qty: 12 TABLET | Refills: 1 | Status: SHIPPED | OUTPATIENT
Start: 2024-11-14 | End: 2025-05-01

## 2024-11-14 RX ORDER — ALENDRONATE SODIUM 70 MG/1
70 TABLET ORAL
Qty: 12 TABLET | Refills: 1 | Status: CANCELLED | OUTPATIENT
Start: 2024-11-14 | End: 2025-05-01

## 2024-11-15 ENCOUNTER — PHARMACY VISIT (OUTPATIENT)
Dept: PHARMACY | Facility: CLINIC | Age: 74
End: 2024-11-15
Payer: MEDICAID

## 2024-12-05 ENCOUNTER — APPOINTMENT (OUTPATIENT)
Dept: OPHTHALMOLOGY | Facility: CLINIC | Age: 74
End: 2024-12-05
Payer: COMMERCIAL

## 2024-12-23 DIAGNOSIS — I10 PRIMARY HYPERTENSION: ICD-10-CM

## 2024-12-24 ENCOUNTER — PHARMACY VISIT (OUTPATIENT)
Dept: PHARMACY | Facility: CLINIC | Age: 74
End: 2024-12-24
Payer: MEDICAID

## 2024-12-24 PROCEDURE — RXMED WILLOW AMBULATORY MEDICATION CHARGE

## 2024-12-24 RX ORDER — LOSARTAN POTASSIUM 100 MG/1
TABLET ORAL
Qty: 90 TABLET | Refills: 1 | Status: SHIPPED | OUTPATIENT
Start: 2024-12-24

## 2025-02-04 ENCOUNTER — PHARMACY VISIT (OUTPATIENT)
Dept: PHARMACY | Facility: CLINIC | Age: 75
End: 2025-02-04
Payer: COMMERCIAL

## 2025-02-04 PROCEDURE — RXOTC WILLOW AMBULATORY OTC CHARGE

## 2025-02-04 PROCEDURE — RXMED WILLOW AMBULATORY MEDICATION CHARGE

## 2025-02-13 ENCOUNTER — APPOINTMENT (OUTPATIENT)
Dept: OPHTHALMOLOGY | Facility: CLINIC | Age: 75
End: 2025-02-13
Payer: COMMERCIAL

## 2025-02-13 DIAGNOSIS — H25.813 COMBINED FORMS OF AGE-RELATED CATARACT OF BOTH EYES: Primary | ICD-10-CM

## 2025-02-13 DIAGNOSIS — H11.823 CONJUNCTIVAL CHALASIS, BILATERAL: ICD-10-CM

## 2025-02-13 DIAGNOSIS — H52.7 UNSPECIFIED DISORDER OF REFRACTION: ICD-10-CM

## 2025-02-13 PROCEDURE — 99214 OFFICE O/P EST MOD 30 MIN: CPT | Performed by: OPHTHALMOLOGY

## 2025-02-13 RX ORDER — ZOLPIDEM TARTRATE 5 MG/1
TABLET ORAL
COMMUNITY
Start: 2025-01-15

## 2025-02-13 ASSESSMENT — CUP TO DISC RATIO
OD_RATIO: 0.35
OS_RATIO: 0.35

## 2025-02-13 ASSESSMENT — REFRACTION_MANIFEST
OS_AXIS: 085
OS_SPHERE: +0.50
OD_AXIS: 175
OD_CYLINDER: -0.25
OS_CYLINDER: -1.00
OD_ADD: +2.75
OD_SPHERE: +0.75
OS_AXIS: 085
OS_SPHERE: +1.00
OD_SPHERE: +0.50
OD_CYLINDER: -0.25
OS_CYLINDER: -0.50
OD_AXIS: 100
METHOD_AUTOREFRACTION: 1
OS_ADD: +2.75

## 2025-02-13 ASSESSMENT — VISUAL ACUITY
OD_CC: 20/20
OD_CC+: -1
OD_BAT_HIGH: 20/20
METHOD: SNELLEN - LINEAR
OS_BAT_HIGH: 20/25
OS_CC: 20/20

## 2025-02-13 ASSESSMENT — REFRACTION_WEARINGRX
OD_SPHERE: +1.00
SPECS_TYPE: BIFOCAL
OS_CYLINDER: -0.50
OS_ADD: +2.50
OD_ADD: +2.50
OS_AXIS: 062
OS_SPHERE: +0.50

## 2025-02-13 ASSESSMENT — ENCOUNTER SYMPTOMS
CONSTITUTIONAL NEGATIVE: 0
ALLERGIC/IMMUNOLOGIC NEGATIVE: 0
MUSCULOSKELETAL NEGATIVE: 0
PSYCHIATRIC NEGATIVE: 0
RESPIRATORY NEGATIVE: 0
HEMATOLOGIC/LYMPHATIC NEGATIVE: 0
NEUROLOGICAL NEGATIVE: 0
ENDOCRINE NEGATIVE: 0
GASTROINTESTINAL NEGATIVE: 0
EYES NEGATIVE: 0
CARDIOVASCULAR NEGATIVE: 0

## 2025-02-13 ASSESSMENT — PATIENT HEALTH QUESTIONNAIRE - PHQ9
SUM OF ALL RESPONSES TO PHQ9 QUESTIONS 1 AND 2: 0
2. FEELING DOWN, DEPRESSED OR HOPELESS: NOT AT ALL
1. LITTLE INTEREST OR PLEASURE IN DOING THINGS: NOT AT ALL

## 2025-02-13 ASSESSMENT — TONOMETRY
OD_IOP_MMHG: 12
OS_IOP_MMHG: 13
IOP_METHOD: GOLDMANN APPLANATION

## 2025-02-13 ASSESSMENT — EXTERNAL EXAM - RIGHT EYE: OD_EXAM: NORMAL

## 2025-02-13 ASSESSMENT — KERATOMETRY
OS_K2POWER_DIOPTERS: 42.50
OD_K2POWER_DIOPTERS: 43.75
OS_AXISANGLE2_DEGREES: 20
OS_AXISANGLE_DEGREES: 110
OD_K1POWER_DIOPTERS: 42.50
OD_AXISANGLE_DEGREES: 95
OS_K1POWER_DIOPTERS: 42.25
OD_AXISANGLE2_DEGREES: 5

## 2025-02-13 ASSESSMENT — EXTERNAL EXAM - LEFT EYE: OS_EXAM: NORMAL

## 2025-02-13 ASSESSMENT — SLIT LAMP EXAM - LIDS
COMMENTS: NORMAL
COMMENTS: NORMAL

## 2025-02-13 ASSESSMENT — PAIN SCALES - GENERAL: PAINLEVEL_OUTOF10: 0-NO PAIN

## 2025-02-13 NOTE — PATIENT INSTRUCTIONS
Thank you so much for choosing me to provide your care today!    If you were dilated your vision may remain blurry   or light sensitive for several hours.    The nature of eye and vision problems can require frequent follow up, please make every effort to adhere to any future appointments.    If you have any issues, questions, or concerns,   please do not hesitate to reach out.    If you receive a survey in regards to your care today, please mention any exceptional care my office staff and/or technicians provided.    You can reach our office at this number:    [phone]    Please consider signing up for and utilizing [employer]!  This is the best way to directly reach me or other  providers

## 2025-02-13 NOTE — ASSESSMENT & PLAN NOTE
Non significant via glare. Non significant cataract noted on exam. Will plan to continue to monitor with serial exam.

## 2025-02-13 NOTE — ASSESSMENT & PLAN NOTE
Suspect more a role in epiphora than dryness but likely having some reflex tearing as well. OK to continue supplemental lubrication as needed.

## 2025-02-13 NOTE — ASSESSMENT & PLAN NOTE
New Rx for glasses/SCL given per patient request. Patient's signature obtained to acknowledge and confirm that a paper copy of glasses/SCL Rx was given to patient in compliance with [facility] Eyeglass Rule. Electronic copy of Rx will also be available via [employer]EPIC.

## 2025-02-13 NOTE — PROGRESS NOTES
Assessment/Plan   Problem List Items Addressed This Visit       Combined forms of age-related cataract of both eyes - Primary     Non significant via glare. Non significant cataract noted on exam. Will plan to continue to monitor with serial exam.            Unspecified disorder of refraction     New Rx for glasses/SCL given per patient request. Patient's signature obtained to acknowledge and confirm that a paper copy of glasses/SCL Rx was given to patient in compliance with [facility] Eyeglass Rule. Electronic copy of Rx will also be available via [employer]EP[employer].            Conjunctival chalasis, bilateral     Suspect more a role in epiphora than dryness but likely having some reflex tearing as well. OK to continue supplemental lubrication as needed.             Provided reassurance regarding above diagnoses and care received in the office visit today. Discussed outcomes and options along with the importance of treatment compliance. Understands the importance of any follow up visits. Patient instructed to call/communicate with our office if any new issues, questions, or concerns.     Will plan to see back in 1 year full glare or sooner PRN

## 2025-02-18 ENCOUNTER — APPOINTMENT (OUTPATIENT)
Dept: PHYSICAL MEDICINE AND REHAB | Facility: CLINIC | Age: 75
End: 2025-02-18
Payer: COMMERCIAL

## 2025-02-19 ENCOUNTER — PHARMACY VISIT (OUTPATIENT)
Dept: PHARMACY | Facility: CLINIC | Age: 75
End: 2025-02-19
Payer: MEDICAID

## 2025-02-19 ENCOUNTER — OFFICE VISIT (OUTPATIENT)
Dept: PRIMARY CARE | Facility: CLINIC | Age: 75
End: 2025-02-19
Payer: COMMERCIAL

## 2025-02-19 VITALS
SYSTOLIC BLOOD PRESSURE: 124 MMHG | HEART RATE: 97 BPM | BODY MASS INDEX: 26.02 KG/M2 | WEIGHT: 140 LBS | DIASTOLIC BLOOD PRESSURE: 84 MMHG | OXYGEN SATURATION: 99 % | TEMPERATURE: 97.3 F

## 2025-02-19 DIAGNOSIS — E21.0 PRIMARY HYPERPARATHYROIDISM (MULTI): ICD-10-CM

## 2025-02-19 DIAGNOSIS — M35.9 DISORDER OF CONNECTIVE TISSUE (MULTI): ICD-10-CM

## 2025-02-19 DIAGNOSIS — M54.2 CERVICALGIA: ICD-10-CM

## 2025-02-19 DIAGNOSIS — I10 PRIMARY HYPERTENSION: Primary | ICD-10-CM

## 2025-02-19 PROCEDURE — RXMED WILLOW AMBULATORY MEDICATION CHARGE

## 2025-02-19 PROCEDURE — 1125F AMNT PAIN NOTED PAIN PRSNT: CPT | Performed by: INTERNAL MEDICINE

## 2025-02-19 PROCEDURE — 99213 OFFICE O/P EST LOW 20 MIN: CPT | Performed by: INTERNAL MEDICINE

## 2025-02-19 PROCEDURE — 3079F DIAST BP 80-89 MM HG: CPT | Performed by: INTERNAL MEDICINE

## 2025-02-19 PROCEDURE — 3074F SYST BP LT 130 MM HG: CPT | Performed by: INTERNAL MEDICINE

## 2025-02-19 PROCEDURE — 4004F PT TOBACCO SCREEN RCVD TLK: CPT | Performed by: INTERNAL MEDICINE

## 2025-02-19 PROCEDURE — 1157F ADVNC CARE PLAN IN RCRD: CPT | Performed by: INTERNAL MEDICINE

## 2025-02-19 PROCEDURE — 1159F MED LIST DOCD IN RCRD: CPT | Performed by: INTERNAL MEDICINE

## 2025-02-19 RX ORDER — METHOCARBAMOL 500 MG/1
500 TABLET, FILM COATED ORAL 4 TIMES DAILY PRN
Qty: 30 TABLET | Refills: 0 | Status: SHIPPED | OUTPATIENT
Start: 2025-02-19 | End: 2025-04-20

## 2025-02-19 RX ORDER — METHYLPREDNISOLONE 4 MG/1
TABLET ORAL
Qty: 21 TABLET | Refills: 0 | Status: SHIPPED | OUTPATIENT
Start: 2025-02-19 | End: 2025-02-25

## 2025-02-19 ASSESSMENT — ENCOUNTER SYMPTOMS
TROUBLE SWALLOWING: 0
POLYDIPSIA: 0
ABDOMINAL PAIN: 0
TREMORS: 0
SEIZURES: 0
CHILLS: 0
DYSURIA: 0
WHEEZING: 0
SINUS PRESSURE: 0
DEPRESSION: 0
BLOOD IN STOOL: 0
FATIGUE: 0
MYALGIAS: 0
NAUSEA: 0
UNEXPECTED WEIGHT CHANGE: 0
POLYPHAGIA: 0
OCCASIONAL FEELINGS OF UNSTEADINESS: 0
SHORTNESS OF BREATH: 0
COUGH: 0
NECK PAIN: 1
VOMITING: 0
BACK PAIN: 1
ARTHRALGIAS: 1
LOSS OF SENSATION IN FEET: 0
PALPITATIONS: 0
NUMBNESS: 0
FREQUENCY: 0

## 2025-02-19 ASSESSMENT — PAIN SCALES - GENERAL: PAINLEVEL_OUTOF10: 7

## 2025-02-19 NOTE — PROGRESS NOTES
Subjective   Patient ID: Christiana Ferguson is a 74 y.o. female who presents for Follow-up (4mon follow up).    HPI     Hypertension, EDWAR, family history of premature CAD  MGUS, carpal tunnel, fibromyalgia, osteoarthritis, anxiety disorder       H/O Hypertension- seen Dr Martinez, in 2023 for elevated blood pressure reading  Also seen Dr Boland at at [facility] in February 2024  Stated she was started on hydrochlorothiazide, which she didn't take due to dizziness    H/O Osteoporosis- seeing Jeovany    H/O Fibromyalgia, chronic pain, seeing pain management Dr Veliz, getting acupuncture  Had right shoulder taped    H/O Sleep disorder, anxiety- sees psychiatrist at [facility]    MGUS- saw hematologist in 2021, she had testing after 24 and me test showed she has high M protein. Was advised by hematology that she doesn't need further follow up     Refused age appropriate vaccinations      Right sided neck pain since last 4 days, started at night, woke her up from sleep, pain radiating down right arm towards her chest on right  Taking 800 mg  Ibuprofen which helps    Review of Systems   Constitutional:  Negative for chills, fatigue and unexpected weight change.   HENT:  Negative for postnasal drip, sinus pressure and trouble swallowing.    Respiratory:  Negative for cough, shortness of breath and wheezing.    Cardiovascular:  Negative for chest pain, palpitations and leg swelling.   Gastrointestinal:  Negative for abdominal pain, blood in stool, nausea and vomiting.   Endocrine: Negative for polydipsia, polyphagia and polyuria.   Genitourinary:  Negative for dysuria and frequency.   Musculoskeletal:  Positive for arthralgias, back pain and neck pain. Negative for myalgias.   Skin:  Negative for rash.   Neurological:  Negative for tremors, seizures and numbness.   Psychiatric/Behavioral:  Negative for behavioral problems.        Objective   /84 (BP Location: Left arm, Patient Position: Sitting, BP Cuff Size:  Adult)   Pulse 97   Temp 36.3 °C (97.3 °F) (Temporal)   Wt 63.5 kg (140 lb)   SpO2 99%   BMI 26.02 kg/m²     Physical Exam  Constitutional:       General: She is not in acute distress.     Appearance: Normal appearance.   HENT:      Head: Normocephalic and atraumatic.   Eyes:      Extraocular Movements: Extraocular movements intact.      Conjunctiva/sclera: Conjunctivae normal.   Cardiovascular:      Rate and Rhythm: Normal rate and regular rhythm.      Heart sounds: Normal heart sounds. No murmur heard.     No friction rub.   Pulmonary:      Effort: Pulmonary effort is normal.      Breath sounds: Normal breath sounds. No wheezing or rales.   Abdominal:      General: Bowel sounds are normal.      Palpations: Abdomen is soft.      Tenderness: There is no abdominal tenderness. There is no guarding.   Musculoskeletal:      Cervical back: Normal range of motion and neck supple.      Right lower leg: No edema.      Left lower leg: No edema.      Comments: Reduced range of motion of right shoulder, right sided neck pain on palpation   Lymphadenopathy:      Cervical: No cervical adenopathy.   Neurological:      General: No focal deficit present.      Mental Status: She is alert and oriented to person, place, and time.      Cranial Nerves: No cranial nerve deficit.      Gait: Gait normal.   Psychiatric:         Mood and Affect: Mood normal.         Assessment/Plan        Christiana was seen today for follow-up.  Diagnoses and all orders for this visit:  Cervicalgia (Primary)  -     methylPREDNISolone (Medrol Dospak) 4 mg tablets; Take as directed on package.  -     methocarbamol (Robaxin) 500 mg tablet; Take 1 tablet (500 mg) by mouth 4 times a day as needed for muscle spasms.  Primary hypertension  Primary hyperparathyroidism (Multi)  Disorder of connective tissue (Multi)        Past Medical History:   Diagnosis Date    Age-related nuclear cataract of both eyes     Anxiety     Dry eye syndrome of bilateral lacrimal  glands     Neck pain     Unspecified disorder of refraction      History reviewed. No pertinent surgical history.    Advised to continue with losartan and amlodipine    Follow-up with home blood pressure readings goal reading less than 140/80        Study Result    Narrative & Impression   Interpreted By:  Jorge Britt,   STUDY:  XR CERVICAL SPINE COMPLETE 4-5 VIEWS      INDICATION:  Signs/Symptoms:arthritis.      COMPARISON:  None      ACCESSION NUMBER(S):  [id number]      ORDERING CLINICIAN:  DIANE GREGORY      FINDINGS:  Advanced cervical degenerative changes at all levels with multilevel  bilateral neural foraminal narrowing. Alignment normal. Prevertebral  soft tissues normal.      IMPRESSION:  Advanced cervical degenerative changes.      Signed by: Jorge Britt 6/15/2024 10:14 AM  Dictation workstation:   [id number]       Current Outpatient Medications   Medication Instructions    acetaminophen (Tylenol) 325 mg tablet 2 tablets, Every 6 hours PRN    alendronate (FOSAMAX) 70 mg, oral, Every 7 days, Take in the morning with a full glass of water, on an empty stomach, and do not take anything else by mouth or lie down for the next 30 min.    amLODIPine (NORVASC) 10 mg, oral, Daily    busPIRone (Buspar) 15 mg tablet 1 tablet, 3 times daily    calcium carbonate EX (Tums E-X) 300 mg (750 mg) chewable tablet 1 tablet, Daily    cholecalciferol (Vitamin D-3) 5,000 Units tablet as directed Orally    clonazePAM (KlonoPIN) 0.5 mg tablet 1 tablet, 2 times daily PRN    diclofenac sodium (Voltaren) 1 % gel     divalproex (Depakote ER) 250 mg 24 hr tablet 1-2 tablets, Nightly PRN    gabapentin (NEURONTIN) 600 mg, oral, 3 times daily    ibuprofen 800 mg, Every 6 hours PRN    losartan (Cozaar) 100 mg tablet Take 1 tablet by mouth once daily    methocarbamol (ROBAXIN) 500 mg, oral, 4 times daily PRN    methylPREDNISolone (Medrol Dospak) 4 mg tablets Take as directed on package.    risedronate (Actonel) 150 mg tablet PLEASE SEE  ATTACHED FOR DETAILED DIRECTIONS    zolpidem (Ambien) 5 mg tablet

## 2025-02-27 PROCEDURE — RXMED WILLOW AMBULATORY MEDICATION CHARGE

## 2025-02-28 ENCOUNTER — PHARMACY VISIT (OUTPATIENT)
Dept: PHARMACY | Facility: CLINIC | Age: 75
End: 2025-02-28
Payer: MEDICAID

## 2025-03-12 ENCOUNTER — APPOINTMENT (OUTPATIENT)
Dept: PHYSICAL MEDICINE AND REHAB | Facility: CLINIC | Age: 75
End: 2025-03-12
Payer: COMMERCIAL

## 2025-03-27 ENCOUNTER — PHARMACY VISIT (OUTPATIENT)
Dept: PHARMACY | Facility: CLINIC | Age: 75
End: 2025-03-27
Payer: MEDICAID

## 2025-03-27 PROCEDURE — RXMED WILLOW AMBULATORY MEDICATION CHARGE

## 2025-05-02 ENCOUNTER — PHARMACY VISIT (OUTPATIENT)
Dept: PHARMACY | Facility: CLINIC | Age: 75
End: 2025-05-02
Payer: MEDICAID

## 2025-05-02 DIAGNOSIS — M81.0 AGE RELATED OSTEOPOROSIS, UNSPECIFIED PATHOLOGICAL FRACTURE PRESENCE: ICD-10-CM

## 2025-05-02 PROCEDURE — RXMED WILLOW AMBULATORY MEDICATION CHARGE

## 2025-05-02 RX ORDER — ALENDRONATE SODIUM 70 MG/1
70 TABLET ORAL
Qty: 12 TABLET | Refills: 1 | Status: SHIPPED | OUTPATIENT
Start: 2025-05-02 | End: 2025-10-17

## 2025-05-09 DIAGNOSIS — M19.042 PRIMARY OSTEOARTHRITIS OF LEFT HAND: ICD-10-CM

## 2025-05-09 PROCEDURE — RXMED WILLOW AMBULATORY MEDICATION CHARGE

## 2025-05-09 RX ORDER — DICLOFENAC SODIUM 10 MG/G
2 GEL TOPICAL 2 TIMES DAILY PRN
Qty: 100 G | Refills: 2 | Status: SHIPPED | OUTPATIENT
Start: 2025-05-09 | End: 2025-06-23

## 2025-05-12 DIAGNOSIS — I10 PRIMARY HYPERTENSION: ICD-10-CM

## 2025-05-12 RX ORDER — LOSARTAN POTASSIUM 100 MG/1
TABLET ORAL
Qty: 90 TABLET | Refills: 1 | Status: SHIPPED | OUTPATIENT
Start: 2025-05-12

## 2025-05-12 RX ORDER — AMLODIPINE BESYLATE 10 MG/1
10 TABLET ORAL DAILY
Qty: 90 TABLET | Refills: 1 | Status: SHIPPED | OUTPATIENT
Start: 2025-05-12 | End: 2025-11-08

## 2025-05-13 DIAGNOSIS — M19.042 PRIMARY OSTEOARTHRITIS OF LEFT HAND: ICD-10-CM

## 2025-05-13 PROCEDURE — RXMED WILLOW AMBULATORY MEDICATION CHARGE

## 2025-05-13 RX ORDER — LIDOCAINE 50 MG/G
1 PATCH TOPICAL DAILY
Qty: 14 PATCH | Refills: 3 | Status: SHIPPED | OUTPATIENT
Start: 2025-05-13 | End: 2025-06-12

## 2025-05-13 RX ORDER — DICLOFENAC SODIUM 10 MG/G
2 GEL TOPICAL 2 TIMES DAILY PRN
Qty: 100 G | Refills: 2 | Status: SHIPPED | OUTPATIENT
Start: 2025-05-13 | End: 2025-06-27

## 2025-05-13 RX ORDER — LIDOCAINE 50 MG/G
1 PATCH TOPICAL DAILY
COMMUNITY
End: 2025-05-13 | Stop reason: SDUPTHER

## 2025-05-15 ENCOUNTER — PHARMACY VISIT (OUTPATIENT)
Dept: PHARMACY | Facility: CLINIC | Age: 75
End: 2025-05-15
Payer: MEDICAID

## 2025-06-09 DIAGNOSIS — M79.7 FIBROMYALGIA: ICD-10-CM

## 2025-06-09 DIAGNOSIS — M54.2 NECK PAIN: ICD-10-CM

## 2025-06-09 DIAGNOSIS — M79.641 RIGHT HAND PAIN: ICD-10-CM

## 2025-06-09 DIAGNOSIS — G89.29 CHRONIC PAIN OF MULTIPLE SITES: ICD-10-CM

## 2025-06-09 DIAGNOSIS — R52 CHRONIC PAIN OF MULTIPLE SITES: ICD-10-CM

## 2025-06-09 RX ORDER — GABAPENTIN 600 MG/1
600 TABLET ORAL 3 TIMES DAILY
Qty: 90 TABLET | Refills: 3 | Status: SHIPPED | OUTPATIENT
Start: 2025-06-09

## 2025-07-16 ENCOUNTER — TELEPHONE (OUTPATIENT)
Dept: PRIMARY CARE | Facility: CLINIC | Age: 75
End: 2025-07-16
Payer: COMMERCIAL

## 2025-07-16 DIAGNOSIS — M79.7 FIBROMYALGIA: Primary | ICD-10-CM

## 2025-07-16 NOTE — TELEPHONE ENCOUNTER
Pt requesting handicapped placard. Her prescription will be expiring in a few months, please advise

## 2025-07-17 PROCEDURE — RXMED WILLOW AMBULATORY MEDICATION CHARGE

## 2025-07-18 ENCOUNTER — PHARMACY VISIT (OUTPATIENT)
Dept: PHARMACY | Facility: CLINIC | Age: 75
End: 2025-07-18
Payer: MEDICAID

## 2025-07-18 PROCEDURE — RXOTC WILLOW AMBULATORY OTC CHARGE

## 2025-08-26 DIAGNOSIS — M81.0 AGE RELATED OSTEOPOROSIS, UNSPECIFIED PATHOLOGICAL FRACTURE PRESENCE: ICD-10-CM

## 2025-08-26 RX ORDER — ALENDRONATE SODIUM 70 MG/1
TABLET ORAL
Qty: 12 TABLET | Refills: 0 | Status: SHIPPED | OUTPATIENT
Start: 2025-08-26

## 2026-02-23 ENCOUNTER — APPOINTMENT (OUTPATIENT)
Dept: OPHTHALMOLOGY | Facility: CLINIC | Age: 76
End: 2026-02-23
Payer: COMMERCIAL

## 2026-02-26 ENCOUNTER — APPOINTMENT (OUTPATIENT)
Dept: OPHTHALMOLOGY | Facility: CLINIC | Age: 76
End: 2026-02-26
Payer: COMMERCIAL